# Patient Record
Sex: FEMALE | Race: WHITE | NOT HISPANIC OR LATINO | Employment: FULL TIME | URBAN - METROPOLITAN AREA
[De-identification: names, ages, dates, MRNs, and addresses within clinical notes are randomized per-mention and may not be internally consistent; named-entity substitution may affect disease eponyms.]

---

## 2017-10-18 ENCOUNTER — OFFICE VISIT (OUTPATIENT)
Dept: FAMILY MEDICINE CLINIC | Facility: CLINIC | Age: 42
End: 2017-10-18

## 2017-10-18 VITALS
WEIGHT: 160 LBS | TEMPERATURE: 98 F | HEART RATE: 73 BPM | HEIGHT: 69 IN | SYSTOLIC BLOOD PRESSURE: 95 MMHG | DIASTOLIC BLOOD PRESSURE: 66 MMHG | RESPIRATION RATE: 16 BRPM | BODY MASS INDEX: 23.7 KG/M2

## 2017-10-18 DIAGNOSIS — R10.11 RUQ DISCOMFORT: Primary | ICD-10-CM

## 2017-10-18 PROBLEM — K80.20 CALCULUS OF GALLBLADDER WITHOUT CHOLECYSTITIS WITHOUT OBSTRUCTION: Status: ACTIVE | Noted: 2017-06-21

## 2017-10-18 PROCEDURE — 99202 OFFICE O/P NEW SF 15 MIN: CPT | Performed by: FAMILY MEDICINE

## 2017-10-18 NOTE — PROGRESS NOTES
"Subjective   Opal Yates is a 42 y.o. female.     CC: Establishment of Care for Abdominal Pain    History of Present Illness     Pt presents for the first time today to establish care and discuss some RUQ pain she has had since her GB removed by Dr. Arellano in June this year. Pt reports it's not really \"pain\" but rather a waxing/waning \"sensation\" in the RUQ, sometimes after eating. BMs reported as pretty regular (has a h/o IBS when younger).     Also, pt reports a FH of CAD.  The following portions of the patient's history were reviewed and updated as appropriate: allergies, current medications, past family history, past medical history, past social history, past surgical history and problem list.    Review of Systems   Constitutional: Negative for activity change, chills, fatigue and fever.   Respiratory: Negative for cough and chest tightness.    Cardiovascular: Negative for chest pain and palpitations.   Gastrointestinal: Positive for abdominal pain. Negative for nausea.   Endocrine: Negative for cold intolerance.   Psychiatric/Behavioral: Negative for behavioral problems and dysphoric mood.     BP 95/66  Pulse 73  Temp 98 °F (36.7 °C) (Oral)   Resp 16  Ht 69\" (175.3 cm)  Wt 160 lb (72.6 kg)  BMI 23.63 kg/m2    Objective   Physical Exam   Constitutional: She appears well-developed and well-nourished.   Neck: Neck supple. No thyromegaly present.   Cardiovascular: Normal rate and regular rhythm.    No murmur heard.  Pulmonary/Chest: Effort normal and breath sounds normal.   Abdominal: Bowel sounds are normal.   Psychiatric: She has a normal mood and affect. Her behavior is normal.   Nursing note and vitals reviewed.  Geoffrey present for exam.  Labs from prior MD done in August reviewed and good.     Assessment/Plan   Opal was seen today for gallbladder removed 6/30/17 and family heart disease.    Diagnoses and all orders for this visit:    RUQ discomfort      Pt doing actually quite well and will monitor at " present.

## 2017-11-21 ENCOUNTER — ANESTHESIA (OUTPATIENT)
Dept: GASTROENTEROLOGY | Facility: HOSPITAL | Age: 42
End: 2017-11-21

## 2017-11-21 ENCOUNTER — HOSPITAL ENCOUNTER (OUTPATIENT)
Facility: HOSPITAL | Age: 42
Setting detail: HOSPITAL OUTPATIENT SURGERY
Discharge: HOME | End: 2017-11-21
Attending: SURGERY | Admitting: SURGERY

## 2017-11-21 ENCOUNTER — ANESTHESIA EVENT (OUTPATIENT)
Dept: GASTROENTEROLOGY | Facility: HOSPITAL | Age: 42
End: 2017-11-21

## 2017-11-21 VITALS
HEART RATE: 76 BPM | WEIGHT: 157.1 LBS | OXYGEN SATURATION: 100 % | BODY MASS INDEX: 23.27 KG/M2 | TEMPERATURE: 98.2 F | DIASTOLIC BLOOD PRESSURE: 65 MMHG | HEIGHT: 69 IN | SYSTOLIC BLOOD PRESSURE: 98 MMHG | RESPIRATION RATE: 14 BRPM

## 2017-11-21 LAB
B-HCG UR QL: NEGATIVE
INTERNAL NEGATIVE CONTROL: NEGATIVE
INTERNAL POSITIVE CONTROL: POSITIVE
Lab: NORMAL

## 2017-11-21 PROCEDURE — 25010000002 PROPOFOL 10 MG/ML EMULSION: Performed by: ANESTHESIOLOGY

## 2017-11-21 RX ORDER — PROPOFOL 10 MG/ML
VIAL (ML) INTRAVENOUS AS NEEDED
Status: DISCONTINUED | OUTPATIENT
Start: 2017-11-21 | End: 2017-11-21 | Stop reason: SURG

## 2017-11-21 RX ORDER — LIDOCAINE HYDROCHLORIDE 20 MG/ML
INJECTION, SOLUTION INFILTRATION; PERINEURAL AS NEEDED
Status: DISCONTINUED | OUTPATIENT
Start: 2017-11-21 | End: 2017-11-21 | Stop reason: SURG

## 2017-11-21 RX ORDER — PROPOFOL 10 MG/ML
VIAL (ML) INTRAVENOUS CONTINUOUS PRN
Status: DISCONTINUED | OUTPATIENT
Start: 2017-11-21 | End: 2017-11-21 | Stop reason: SURG

## 2017-11-21 RX ORDER — SODIUM CHLORIDE 0.9 % (FLUSH) 0.9 %
1-10 SYRINGE (ML) INJECTION AS NEEDED
Status: DISCONTINUED | OUTPATIENT
Start: 2017-11-21 | End: 2017-11-21 | Stop reason: HOSPADM

## 2017-11-21 RX ORDER — SODIUM CHLORIDE, SODIUM LACTATE, POTASSIUM CHLORIDE, CALCIUM CHLORIDE 600; 310; 30; 20 MG/100ML; MG/100ML; MG/100ML; MG/100ML
30 INJECTION, SOLUTION INTRAVENOUS CONTINUOUS PRN
Status: DISCONTINUED | OUTPATIENT
Start: 2017-11-21 | End: 2017-11-21 | Stop reason: HOSPADM

## 2017-11-21 RX ADMIN — LIDOCAINE HYDROCHLORIDE 60 MG: 20 INJECTION, SOLUTION INFILTRATION; PERINEURAL at 09:18

## 2017-11-21 RX ADMIN — SODIUM CHLORIDE, POTASSIUM CHLORIDE, SODIUM LACTATE AND CALCIUM CHLORIDE 30 ML/HR: 600; 310; 30; 20 INJECTION, SOLUTION INTRAVENOUS at 09:11

## 2017-11-21 RX ADMIN — PROPOFOL 100 MG: 10 INJECTION, EMULSION INTRAVENOUS at 09:18

## 2017-11-21 RX ADMIN — PROPOFOL 100 MCG/KG/MIN: 10 INJECTION, EMULSION INTRAVENOUS at 09:18

## 2017-11-21 NOTE — H&P
2017    Date: 2017     Patient: Opal Yates    : 1975   [id number]       History:   The patient is a 42 y.o. female of Jonas Odonnell MD  who presents for screening colonoscopy. The patient currently has no complaints. she  denies any history of nausea, abdominal pain, weight loss, change in bowel habits or rectal bleeding.   Family history is positive for for colon cancer or polyps.  Her sister was recently diagnosed with colon cancer at age 47  The patient has not had a colonoscopy in the past .      The following portions of the patient's history were reviewed and updated as appropriate: allergies, current medications, past family history, past medical history, past social history, past surgical history and problem list.    Past Medical History:   Diagnosis Date   • Calculus of gallbladder without cholecystitis without obstruction 2017   • Irritable bowel disease       Past Surgical History:   Procedure Laterality Date   •  SECTION     • CHOLECYSTECTOMY     • MAMMO BILATERAL  2016    dr pfeiffer   • PAP SMEAR  2016    dr pfeiffer   • TUBAL ABDOMINAL LIGATION       Medications:   Prescriptions Prior to Admission   Medication Sig Dispense Refill Last Dose   • Cetirizine HCl 10 MG capsule Take  by mouth.   2017 at Unknown time   • ibuprofen (ADVIL,MOTRIN) 800 MG tablet Take 800 mg by mouth.   Past Week at Unknown time      Allergies: Penicillins   Social History:  Social History     Social History   • Marital status:      Spouse name: N/A   • Number of children: N/A   • Years of education: N/A     Social History Main Topics   • Smoking status: Never Smoker   • Smokeless tobacco: Never Used   • Alcohol use Yes      Comment: rarely   • Drug use: No   • Sexual activity: Yes     Partners: Male     Other Topics Concern   • None     Social History Narrative        Family History   Problem Relation Age of Onset   • Breast cancer Mother    • Cervical cancer  Maternal Grandmother    • Colon cancer Maternal Grandfather    • Colon cancer Sister           Review of Systems:   Negative  ros    Physical Examination:  General - well developed  female in no distress.  HEENT - pupils are equal, conjunctiva are pink, sclera are non-icteric.  Mouth - mucous membranes are moist. Speech is normal with no hoarseness.  Neck - supple, no adenopathy or masses, no JVD.  Chest - clear.  Heart - regular rate and rhythm.  Abomen - soft, non-tender, non-distended, no masses or hernias.   Ext - no edema or cyanosis, capillary refill brisk.    Impression:  42 y.o. female for screening colonoscopy.    Plan:  Screening colonoscopy.  Generalities of the procedure were described.  Risks of bleeding and/or bowel perforation were discussed.      Signature: No name on file.     CC: Jonas Odonnell MD

## 2017-11-21 NOTE — ANESTHESIA POSTPROCEDURE EVALUATION
"Patient: Opal Yates    Procedure Summary     Date Anesthesia Start Anesthesia Stop Room / Location    11/21/17 0915 0941  [facility]       Procedure Diagnosis Surgeon Provider    COLONOSCOPY (N/A ) No diagnosis on file. MD Cynthia Gatica MD          Anesthesia Type: MAC  Last vitals  BP   109/72 (11/21/17 0908)   Temp   36.6 °C (97.9 °F) (11/21/17 0908)   Pulse   90 (11/21/17 0908)   Resp   18 (11/21/17 0908)     SpO2   100 % (11/21/17 0908)     Post Anesthesia Care and Evaluation    Patient location during evaluation: bedside  Patient participation: complete - patient participated  Level of consciousness: awake  Pain management: adequate  Airway patency: patent  Anesthetic complications: No anesthetic complications    Cardiovascular status: acceptable  Respiratory status: acceptable  Hydration status: acceptable    Comments: BP 92/49 (BP Location: Left arm, Patient Position: Lying)  Pulse 81  Temp 36.6 °C (97.9 °F) (Oral)   Resp 14  Ht 69\" (175.3 cm)  Wt 157 lb 1.6 oz (71.3 kg)  [facility] 11/13/2017  SpO2 98%  BMI 23.2 kg/m2        "

## 2017-11-21 NOTE — PLAN OF CARE
Problem: Patient Care Overview (Adult)  Goal: Plan of Care Review  Outcome: Ongoing (interventions implemented as appropriate)    11/21/17 0851   Coping/Psychosocial Response Interventions   Plan Of Care Reviewed With patient   Patient Care Overview   Progress no change       Goal: Adult Individualization and Mutuality  Outcome: Ongoing (interventions implemented as appropriate)    11/21/17 0851   Individualization   Patient Specific Preferences none       Goal: Discharge Needs Assessment  Outcome: Ongoing (interventions implemented as appropriate)    11/21/17 0851   Discharge Needs Assessment   Concerns To Be Addressed no discharge needs identified   Living Environment   Transportation Available family or friend will provide         Problem: GI Endoscopy (Adult)  Goal: Signs and Symptoms of Listed Potential Problems Will be Absent or Manageable (GI Endoscopy)  Outcome: Ongoing (interventions implemented as appropriate)    11/21/17 0851   GI Endoscopy   Problems Present (GI Endoscopy) none

## 2017-11-21 NOTE — DISCHARGE INSTRUCTIONS
For the next 24 hours patient needs to be with a responsible adult.    For 24 hours DO NOT drive, operate machinery, appliances, drink alcohol, make important decisions or sign legal documents.    Start with a light or bland diet and advance to regular diet as tolerated.    Follow recommendations on procedure report provided by your doctor.    Call Dr. Arellano for problems [phone]    Problems may include but not limited to: large amounts of bleeding, trouble breathing, repeated vomiting, severe unrelieved pain, fever or chills.

## 2017-11-21 NOTE — ANESTHESIA PREPROCEDURE EVALUATION
Anesthesia Evaluation     Patient summary reviewed and Nursing notes reviewed          Airway   Mallampati: I  Dental          Pulmonary - negative pulmonary ROS and normal exam   Cardiovascular - negative cardio ROS and normal exam        Neuro/Psych- negative ROS  GI/Hepatic/Renal/Endo - negative ROS     Musculoskeletal (-) negative ROS    Abdominal    Substance History - negative use     OB/GYN negative ob/gyn ROS         Other                                        Anesthesia Plan    ASA 2     MAC     intravenous induction   Anesthetic plan and risks discussed with patient.

## 2017-11-29 ENCOUNTER — OFFICE VISIT (OUTPATIENT)
Dept: OBSTETRICS AND GYNECOLOGY | Facility: CLINIC | Age: 42
End: 2017-11-29

## 2017-11-29 VITALS
DIASTOLIC BLOOD PRESSURE: 70 MMHG | WEIGHT: 157 LBS | HEIGHT: 69 IN | SYSTOLIC BLOOD PRESSURE: 100 MMHG | BODY MASS INDEX: 23.25 KG/M2

## 2017-11-29 DIAGNOSIS — Z80.3 FAMILY HISTORY OF BREAST CANCER IN MOTHER: ICD-10-CM

## 2017-11-29 DIAGNOSIS — Z80.0 FAMILY HISTORY OF COLON CANCER: ICD-10-CM

## 2017-11-29 DIAGNOSIS — Z01.419 ENCOUNTER FOR GYNECOLOGICAL EXAMINATION WITHOUT ABNORMAL FINDING: Primary | ICD-10-CM

## 2017-11-29 PROCEDURE — 99396 PREV VISIT EST AGE 40-64: CPT | Performed by: OBSTETRICS & GYNECOLOGY

## 2017-11-29 NOTE — PROGRESS NOTES
Subjective     Opal Yates is a 42 y.o. female for annual gyn exam    History of Present Illness   42-year-old white female  3 para 3 presents for annual exam.  Her menstrual cycles are regular.  She received a mammogram in the office today.  Her mother and maternal aunt have both been diagnosed with breast cancer.  Her older sister age 47 was just diagnosed with a malignant polyp of the colon.  The patient underwent screening colonoscopy last week which was reassuring.  She has no complaints.      The following portions of the patient's history were reviewed and updated as appropriate: allergies, current medications, past family history, past medical history, past social history, past surgical history and problem list.      Review of Systems   Constitutional: Negative for activity change, appetite change, fatigue and unexpected weight change.   HENT: Negative.    Eyes: Negative.    Respiratory: Negative.    Cardiovascular: Negative.    Gastrointestinal: Negative for abdominal distention, abdominal pain, anal bleeding, constipation, diarrhea, nausea and vomiting.   Endocrine: Negative for cold intolerance, heat intolerance, polydipsia, polyphagia and polyuria.   Genitourinary: Negative for difficulty urinating, dysuria, flank pain, frequency, hematuria and urgency.   Musculoskeletal: Negative.    Skin: Negative.    Allergic/Immunologic: Negative.    Neurological: Negative.    Hematological: Negative.    Psychiatric/Behavioral: Negative.        Objective     Physical Exam   Constitutional: She is oriented to person, place, and time. Vital signs are normal. She appears well-developed and well-nourished. She is cooperative.   HENT:   Head: Normocephalic.   Eyes: Conjunctivae are normal. Pupils are equal, round, and reactive to light.   Neck: Normal range of motion. Neck supple. No tracheal deviation present. No thyromegaly present.   Cardiovascular: Normal rate, regular rhythm and normal heart sounds.  Exam  reveals no gallop and no friction rub.    No murmur heard.  Pulmonary/Chest: Effort normal and breath sounds normal. No respiratory distress. She has no wheezes. Right breast exhibits no inverted nipple, no mass, no nipple discharge, no skin change and no tenderness. Left breast exhibits no inverted nipple, no mass, no nipple discharge, no skin change and no tenderness. Breasts are symmetrical. There is no breast swelling.   Abdominal: Soft. Bowel sounds are normal. She exhibits no distension, no ascites and no mass. There is no hepatosplenomegaly. There is no tenderness. There is no rebound, no guarding and no CVA tenderness. No hernia. Hernia confirmed negative in the right inguinal area and confirmed negative in the left inguinal area.   Genitourinary: Rectal exam shows no fissure, no mass, no tenderness and anal tone normal. Pelvic exam was performed with patient supine. No labial fusion. There is no rash, tenderness, lesion or injury on the right labia. There is no rash, tenderness, lesion or injury on the left labia. Uterus is not deviated, not enlarged, not fixed and not tender. Cervix exhibits no motion tenderness, no discharge and no friability. Right adnexum displays no mass, no tenderness and no fullness. Left adnexum displays no mass, no tenderness and no fullness. No erythema, tenderness or bleeding in the vagina. No foreign body in the vagina. No signs of injury around the vagina. No vaginal discharge found.   Musculoskeletal: Normal range of motion. She exhibits no edema or deformity.   Lymphadenopathy:     She has no cervical adenopathy.        Right: No inguinal adenopathy present.        Left: No inguinal adenopathy present.   Neurological: She is alert and oriented to person, place, and time. She has normal reflexes. No cranial nerve deficit. Coordination normal.   Skin: Skin is warm and dry. No rash noted. No erythema.   Psychiatric: She has a normal mood and affect. Her behavior is normal.          Opal was seen today for gynecologic exam.    Diagnoses and all orders for this visit:    Encounter for gynecological examination without abnormal finding  -     Pap IG, Rfx HPV ASCU - ThinPrep Vial, Cervix    Family history of breast cancer in mother    Family history of colon cancer    The patient is doing well.  I encouraged a healthy diet and regular exercise.  Annual exams were recommended.

## 2017-12-11 LAB
CONV .: NORMAL
CYTOLOGIST CVX/VAG CYTO: NORMAL
CYTOLOGY CVX/VAG DOC THIN PREP: NORMAL
DX ICD CODE: NORMAL
HIV 1 & 2 AB SER-IMP: NORMAL
OTHER STN SPEC: NORMAL
PATH REPORT.FINAL DX SPEC: NORMAL
STAT OF ADQ CVX/VAG CYTO-IMP: NORMAL

## 2018-05-29 ENCOUNTER — OFFICE VISIT (OUTPATIENT)
Dept: FAMILY MEDICINE CLINIC | Facility: CLINIC | Age: 43
End: 2018-05-29

## 2018-05-29 VITALS
HEIGHT: 69 IN | DIASTOLIC BLOOD PRESSURE: 61 MMHG | BODY MASS INDEX: 24.44 KG/M2 | RESPIRATION RATE: 14 BRPM | HEART RATE: 64 BPM | WEIGHT: 165 LBS | SYSTOLIC BLOOD PRESSURE: 90 MMHG | TEMPERATURE: 97.9 F

## 2018-05-29 DIAGNOSIS — Z80.0 FAMILY HISTORY OF COLON CANCER: ICD-10-CM

## 2018-05-29 DIAGNOSIS — Z84.81 FAMILY HISTORY OF GENETIC DISEASE CARRIER: Primary | ICD-10-CM

## 2018-05-29 PROCEDURE — 99212 OFFICE O/P EST SF 10 MIN: CPT | Performed by: FAMILY MEDICINE

## 2018-05-29 NOTE — PROGRESS NOTES
"Subjective   Opal Yates is a 42 y.o. female.     CC: Genetic Testing Consultation    History of Present Illness     Pt comes in today to discuss genetic testing. Sister had colon cancer and then got gene testing showing a mutation, and was recommended further testing. Had a negative c-scope last year.    The following portions of the patient's history were reviewed and updated as appropriate: allergies, current medications, past family history, past medical history, past social history, past surgical history and problem list.    Review of Systems   Constitutional: Negative for activity change, chills, fatigue and fever.   Respiratory: Negative for cough and chest tightness.    Cardiovascular: Negative for chest pain and palpitations.   Gastrointestinal: Negative for abdominal pain and nausea.   Endocrine: Negative for cold intolerance.   Psychiatric/Behavioral: Negative for behavioral problems and dysphoric mood.     BP 90/61   Pulse 64   Temp 97.9 °F (36.6 °C) (Oral)   Resp 14   Ht 175.3 cm (69\")   Wt 74.8 kg (165 lb)   BMI 24.37 kg/m²     Objective   Physical Exam   Constitutional: She appears well-developed and well-nourished.   Neck: Neck supple. No thyromegaly present.   Cardiovascular: Normal rate and regular rhythm.    No murmur heard.  Pulmonary/Chest: Effort normal and breath sounds normal.   Abdominal: Bowel sounds are normal. There is no tenderness.   Neurological: She is alert.   Psychiatric: She has a normal mood and affect. Her behavior is normal.   Nursing note and vitals reviewed.      Assessment/Plan   Opal was seen today for referral.    Diagnoses and all orders for this visit:    Family history of genetic disease carrier  -     Ambulatory Referral to Genetics    Family history of colon cancer  -     Ambulatory Referral to Genetics             "

## 2018-12-19 ENCOUNTER — PROCEDURE VISIT (OUTPATIENT)
Dept: OBSTETRICS AND GYNECOLOGY | Facility: CLINIC | Age: 43
End: 2018-12-19

## 2018-12-19 ENCOUNTER — OFFICE VISIT (OUTPATIENT)
Dept: OBSTETRICS AND GYNECOLOGY | Facility: CLINIC | Age: 43
End: 2018-12-19

## 2018-12-19 ENCOUNTER — APPOINTMENT (OUTPATIENT)
Dept: WOMENS IMAGING | Facility: HOSPITAL | Age: 43
End: 2018-12-19

## 2018-12-19 VITALS
SYSTOLIC BLOOD PRESSURE: 100 MMHG | WEIGHT: 165 LBS | BODY MASS INDEX: 24.44 KG/M2 | HEIGHT: 69 IN | DIASTOLIC BLOOD PRESSURE: 70 MMHG

## 2018-12-19 DIAGNOSIS — Z80.3 FAMILY HISTORY OF BREAST CANCER IN MOTHER: ICD-10-CM

## 2018-12-19 DIAGNOSIS — Z80.0 FAMILY HISTORY OF COLON CANCER: ICD-10-CM

## 2018-12-19 DIAGNOSIS — Z01.411 ENCOUNTER FOR GYNECOLOGICAL EXAMINATION WITH ABNORMAL FINDING: Primary | ICD-10-CM

## 2018-12-19 DIAGNOSIS — N90.89 PERINEAL CYST IN FEMALE: ICD-10-CM

## 2018-12-19 DIAGNOSIS — Z12.31 VISIT FOR SCREENING MAMMOGRAM: Primary | ICD-10-CM

## 2018-12-19 PROCEDURE — 77067 SCR MAMMO BI INCL CAD: CPT | Performed by: OBSTETRICS & GYNECOLOGY

## 2018-12-19 PROCEDURE — 99396 PREV VISIT EST AGE 40-64: CPT | Performed by: OBSTETRICS & GYNECOLOGY

## 2018-12-19 PROCEDURE — 77067 SCR MAMMO BI INCL CAD: CPT | Performed by: RADIOLOGY

## 2018-12-19 NOTE — PROGRESS NOTES
Subjective    Opal Yates is a 43 y.o. female for annual gyn exam    History of Present Illness   43-year-old multiparous white female presents for routine gynecologic exam.  Her menstrual cycles are regular.  She is status post tubal ligation in the past.  There is a family history of colon cancer in her sister.  The patient has already had a screening colonoscopy.  The patient states she has noted a bump just to the left of her perineum which has been there for about a year.  It has remained unchanged and there is no tenderness, discharge, or swelling.  The patient had a screening mammogram this morning.      The following portions of the patient's history were reviewed and updated as appropriate: allergies, current medications, past family history, past medical history, past social history, past surgical history and problem list.      Review of Systems   Constitutional: Negative for activity change, appetite change, fatigue and unexpected weight change.   HENT: Negative.    Eyes: Negative.    Respiratory: Negative.    Cardiovascular: Negative.    Gastrointestinal: Negative for abdominal distention, abdominal pain, anal bleeding, constipation, diarrhea, nausea and vomiting.   Endocrine: Negative for cold intolerance, heat intolerance, polydipsia, polyphagia and polyuria.   Genitourinary: Negative for difficulty urinating, dysuria, flank pain, frequency, hematuria and urgency.   Musculoskeletal: Negative.    Skin: Negative.    Allergic/Immunologic: Negative.    Neurological: Negative.    Hematological: Negative.    Psychiatric/Behavioral: Negative.            Physical Exam   Constitutional: She is oriented to person, place, and time. Vital signs are normal. She appears well-developed and well-nourished. She is cooperative.   HENT:   Head: Normocephalic.   Eyes: Conjunctivae are normal. Pupils are equal, round, and reactive to light.   Neck: Normal range of motion. Neck supple. No tracheal deviation present. No  thyromegaly present.   Cardiovascular: Normal rate, regular rhythm and normal heart sounds. Exam reveals no gallop and no friction rub.   No murmur heard.  Pulmonary/Chest: Effort normal and breath sounds normal. No respiratory distress. She has no wheezes. Right breast exhibits no inverted nipple, no mass, no nipple discharge, no skin change and no tenderness. Left breast exhibits no inverted nipple, no mass, no nipple discharge, no skin change and no tenderness. Breasts are symmetrical. There is no breast swelling.   Abdominal: Soft. Bowel sounds are normal. She exhibits no distension, no ascites and no mass. There is no hepatosplenomegaly. There is no tenderness. There is no rebound, no guarding and no CVA tenderness. No hernia. Hernia confirmed negative in the right inguinal area and confirmed negative in the left inguinal area.   Genitourinary: Rectal exam shows no fissure, no mass, no tenderness and anal tone normal.       Pelvic exam was performed with patient supine. No labial fusion. There is no rash, tenderness, lesion or injury on the right labia. There is no rash, tenderness, lesion or injury on the left labia. Uterus is not deviated, not enlarged, not fixed and not tender. Cervix exhibits no motion tenderness, no discharge and no friability. Right adnexum displays no mass, no tenderness and no fullness. Left adnexum displays no mass, no tenderness and no fullness. No erythema, tenderness or bleeding in the vagina. No foreign body in the vagina. No signs of injury around the vagina. No vaginal discharge found.   Genitourinary Comments: There is a 1 cm mobile nontender nodule just to the left of the perineum.  There is no induration or exudate or erythema.  There is no palpable inguinal adenopathy.   Musculoskeletal: Normal range of motion. She exhibits no edema or deformity.   Lymphadenopathy:     She has no cervical adenopathy.        Right: No inguinal adenopathy present.        Left: No inguinal  adenopathy present.   Neurological: She is alert and oriented to person, place, and time. She has normal reflexes. No cranial nerve deficit. Coordination normal.   Skin: Skin is warm and dry. No rash noted. No erythema.   Psychiatric: She has a normal mood and affect. Her behavior is normal.         Opal was seen today for gynecologic exam.    Diagnoses and all orders for this visit:    Encounter for gynecological examination with abnormal finding  -     Pap IG, Rfx HPV ASCU    Family history of breast cancer in mother    Family history of colon cancer    Perineal cyst in female     we discussed her symptoms and the findings.  We discussed options of treatment including excision of the cyst versus observation.  I encouraged a healthy diet and regular exercise.  Annual exams were recommended.

## 2019-08-14 ENCOUNTER — OFFICE VISIT (OUTPATIENT)
Dept: FAMILY MEDICINE CLINIC | Facility: CLINIC | Age: 44
End: 2019-08-14

## 2019-08-14 VITALS
SYSTOLIC BLOOD PRESSURE: 103 MMHG | TEMPERATURE: 98.4 F | DIASTOLIC BLOOD PRESSURE: 71 MMHG | HEART RATE: 83 BPM | WEIGHT: 163 LBS | BODY MASS INDEX: 24.14 KG/M2 | RESPIRATION RATE: 16 BRPM | HEIGHT: 69 IN

## 2019-08-14 DIAGNOSIS — R00.2 HEART PALPITATIONS: Primary | ICD-10-CM

## 2019-08-14 DIAGNOSIS — Z00.00 ANNUAL PHYSICAL EXAM: ICD-10-CM

## 2019-08-14 PROCEDURE — 93000 ELECTROCARDIOGRAM COMPLETE: CPT | Performed by: FAMILY MEDICINE

## 2019-08-14 PROCEDURE — 99214 OFFICE O/P EST MOD 30 MIN: CPT | Performed by: FAMILY MEDICINE

## 2019-08-14 NOTE — PROGRESS NOTES
"Subjective   Opal Yates is a 43 y.o. female.      CC: Palpitations    History of Present Illness     Pt comes in today c/o some AM palpitations of her heart that last 2-3 minutes. Got some \"chest heaviness\" yesterday, too. No GERD-sx reported. No feelings like she could pass out. Comes in cycles when happens.     FH: dad with hypothyroidism and daughter with Hashimotos.    The following portions of the patient's history were reviewed and updated as appropriate: allergies, current medications, past family history, past medical history, past social history, past surgical history and problem list.    Review of Systems   Constitutional: Negative for activity change, chills, fatigue and fever.   Respiratory: Negative for cough and chest tightness.    Cardiovascular: Positive for palpitations. Negative for chest pain.   Gastrointestinal: Negative for abdominal pain and nausea.   Endocrine: Negative for cold intolerance.   Psychiatric/Behavioral: Negative for behavioral problems and dysphoric mood.       /71   Pulse 83   Temp 98.4 °F (36.9 °C) (Oral)   Resp 16   Ht 175.3 cm (69.02\")   Wt 73.9 kg (163 lb)   BMI 24.06 kg/m²     Objective   Physical Exam   Constitutional: She appears well-developed and well-nourished.   Neck: Neck supple. No thyromegaly present.   Cardiovascular: Normal rate and regular rhythm.   No murmur heard.  Pulmonary/Chest: Effort normal and breath sounds normal.   Abdominal: Bowel sounds are normal. There is no tenderness.   Neurological: She is alert.   Psychiatric: She has a normal mood and affect. Her behavior is normal.   Nursing note and vitals reviewed.      Assessment/Plan   Opal was seen today for heart.    Diagnoses and all orders for this visit:    Heart palpitations  -     Comprehensive metabolic panel  -     CBC and Differential  -     TSH  -     Thyroid Stimulating Immunoglobulin  -     Ambulatory Referral to Cardiology  -     ECG 12 Lead    Annual physical " exam  Comments:  for labs only, don't bill  Orders:  -     Comprehensive metabolic panel  -     Lipid panel  -     CBC and Differential  -     TSH

## 2019-08-14 NOTE — PROGRESS NOTES
Procedure     ECG 12 Lead  Date/Time: 8/14/2019 12:25 PM  Performed by: Jonas Odonnell MD  Authorized by: Jonas Odonnell MD   Comparison: not compared with previous ECG   Previous ECG: no previous ECG available  Rhythm: sinus rhythm  Rate: normal  Conduction: conduction normal  ST Segments: ST segments normal  T Waves: T waves normal  QRS axis: normal  Other: no other findings    Clinical impression: normal ECG

## 2019-08-19 LAB
ALBUMIN SERPL-MCNC: 4.4 G/DL (ref 3.5–5.2)
ALBUMIN/GLOB SERPL: 1.8 G/DL
ALP SERPL-CCNC: 53 U/L (ref 39–117)
ALT SERPL-CCNC: 11 U/L (ref 1–33)
AST SERPL-CCNC: 14 U/L (ref 1–32)
BASOPHILS # BLD AUTO: 0.03 10*3/MM3 (ref 0–0.2)
BASOPHILS NFR BLD AUTO: 0.5 % (ref 0–1.5)
BILIRUB SERPL-MCNC: 0.6 MG/DL (ref 0.2–1.2)
BUN SERPL-MCNC: 12 MG/DL (ref 6–20)
BUN/CREAT SERPL: 15 (ref 7–25)
CALCIUM SERPL-MCNC: 9 MG/DL (ref 8.6–10.5)
CHLORIDE SERPL-SCNC: 102 MMOL/L (ref 98–107)
CHOLEST SERPL-MCNC: 171 MG/DL (ref 0–200)
CO2 SERPL-SCNC: 28.8 MMOL/L (ref 22–29)
CREAT SERPL-MCNC: 0.8 MG/DL (ref 0.57–1)
EOSINOPHIL # BLD AUTO: 0.04 10*3/MM3 (ref 0–0.4)
EOSINOPHIL NFR BLD AUTO: 0.6 % (ref 0.3–6.2)
ERYTHROCYTE [DISTWIDTH] IN BLOOD BY AUTOMATED COUNT: 11.9 % (ref 12.3–15.4)
GLOBULIN SER CALC-MCNC: 2.5 GM/DL
GLUCOSE SERPL-MCNC: 86 MG/DL (ref 65–99)
HCT VFR BLD AUTO: 43.3 % (ref 34–46.6)
HDLC SERPL-MCNC: 42 MG/DL (ref 40–60)
HGB BLD-MCNC: 13.8 G/DL (ref 12–15.9)
IMM GRANULOCYTES # BLD AUTO: 0.02 10*3/MM3 (ref 0–0.05)
IMM GRANULOCYTES NFR BLD AUTO: 0.3 % (ref 0–0.5)
LDLC SERPL CALC-MCNC: 109 MG/DL (ref 0–100)
LYMPHOCYTES # BLD AUTO: 1.85 10*3/MM3 (ref 0.7–3.1)
LYMPHOCYTES NFR BLD AUTO: 29.9 % (ref 19.6–45.3)
MCH RBC QN AUTO: 30.9 PG (ref 26.6–33)
MCHC RBC AUTO-ENTMCNC: 31.9 G/DL (ref 31.5–35.7)
MCV RBC AUTO: 96.9 FL (ref 79–97)
MONOCYTES # BLD AUTO: 0.49 10*3/MM3 (ref 0.1–0.9)
MONOCYTES NFR BLD AUTO: 7.9 % (ref 5–12)
NEUTROPHILS # BLD AUTO: 3.76 10*3/MM3 (ref 1.7–7)
NEUTROPHILS NFR BLD AUTO: 60.8 % (ref 42.7–76)
NRBC BLD AUTO-RTO: 0 /100 WBC (ref 0–0.2)
PLATELET # BLD AUTO: 167 10*3/MM3 (ref 140–450)
POTASSIUM SERPL-SCNC: 4.4 MMOL/L (ref 3.5–5.2)
PROT SERPL-MCNC: 6.9 G/DL (ref 6–8.5)
RBC # BLD AUTO: 4.47 10*6/MM3 (ref 3.77–5.28)
SODIUM SERPL-SCNC: 141 MMOL/L (ref 136–145)
TRIGL SERPL-MCNC: 99 MG/DL (ref 0–150)
TSH SERPL DL<=0.005 MIU/L-ACNC: 2.3 MIU/ML (ref 0.27–4.2)
TSI SER-ACNC: <0.1 IU/L (ref 0–0.55)
VLDLC SERPL CALC-MCNC: 19.8 MG/DL
WBC # BLD AUTO: 6.19 10*3/MM3 (ref 3.4–10.8)

## 2019-08-22 ENCOUNTER — OFFICE VISIT (OUTPATIENT)
Dept: CARDIOLOGY | Facility: CLINIC | Age: 44
End: 2019-08-22

## 2019-08-22 VITALS
BODY MASS INDEX: 24.59 KG/M2 | HEIGHT: 69 IN | SYSTOLIC BLOOD PRESSURE: 94 MMHG | DIASTOLIC BLOOD PRESSURE: 70 MMHG | WEIGHT: 166 LBS | OXYGEN SATURATION: 99 % | HEART RATE: 71 BPM

## 2019-08-22 DIAGNOSIS — R00.2 PALPITATION: Primary | ICD-10-CM

## 2019-08-22 DIAGNOSIS — R07.2 PRECORDIAL PAIN: ICD-10-CM

## 2019-08-22 PROCEDURE — 99243 OFF/OP CNSLTJ NEW/EST LOW 30: CPT | Performed by: INTERNAL MEDICINE

## 2019-08-30 ENCOUNTER — HOSPITAL ENCOUNTER (OUTPATIENT)
Dept: CARDIOLOGY | Facility: HOSPITAL | Age: 44
Discharge: HOME | End: 2019-08-30
Admitting: INTERNAL MEDICINE

## 2019-08-30 VITALS
DIASTOLIC BLOOD PRESSURE: 70 MMHG | SYSTOLIC BLOOD PRESSURE: 94 MMHG | HEIGHT: 69 IN | BODY MASS INDEX: 24.59 KG/M2 | WEIGHT: 166 LBS

## 2019-08-30 DIAGNOSIS — R07.2 PRECORDIAL PAIN: ICD-10-CM

## 2019-08-30 DIAGNOSIS — R00.2 PALPITATION: ICD-10-CM

## 2019-08-30 LAB
AORTIC ROOT ANNULUS: 2.4 CM
ASCENDING AORTA: 3.3 CM
BH CV ECHO MEAS - ACS: 1.7 CM
BH CV ECHO MEAS - AO MAX PG (FULL): 5.1 MMHG
BH CV ECHO MEAS - AO MAX PG: 8.5 MMHG
BH CV ECHO MEAS - AO MEAN PG (FULL): 2.5 MMHG
BH CV ECHO MEAS - AO MEAN PG: 4.2 MMHG
BH CV ECHO MEAS - AO ROOT AREA (BSA CORRECTED): 1.5
BH CV ECHO MEAS - AO ROOT AREA: 6.3 CM^2
BH CV ECHO MEAS - AO ROOT DIAM: 2.8 CM
BH CV ECHO MEAS - AO V2 MAX: 145.3 CM/SEC
BH CV ECHO MEAS - AO V2 MEAN: 91.2 CM/SEC
BH CV ECHO MEAS - AO V2 VTI: 29.9 CM
BH CV ECHO MEAS - ASC AORTA: 3.3 CM
BH CV ECHO MEAS - AVA(I,A): 2 CM^2
BH CV ECHO MEAS - AVA(I,D): 2 CM^2
BH CV ECHO MEAS - AVA(V,A): 1.9 CM^2
BH CV ECHO MEAS - AVA(V,D): 1.9 CM^2
BH CV ECHO MEAS - BSA(HAYCOCK): 1.9 M^2
BH CV ECHO MEAS - BSA: 1.8 M^2
BH CV ECHO MEAS - BZI_BMI: 26.8 KILOGRAMS/M^2
BH CV ECHO MEAS - BZI_METRIC_HEIGHT: 167.6 CM
BH CV ECHO MEAS - BZI_METRIC_WEIGHT: 75.3 KG
BH CV ECHO MEAS - EDV(MOD-SP2): 62 ML
BH CV ECHO MEAS - EDV(MOD-SP4): 67 ML
BH CV ECHO MEAS - EDV(TEICH): 89.5 ML
BH CV ECHO MEAS - EF(CUBED): 76.1 %
BH CV ECHO MEAS - EF(MOD-BP): 61 %
BH CV ECHO MEAS - EF(MOD-SP2): 61.3 %
BH CV ECHO MEAS - EF(MOD-SP4): 58.2 %
BH CV ECHO MEAS - EF(TEICH): 68.2 %
BH CV ECHO MEAS - ESV(MOD-SP2): 24 ML
BH CV ECHO MEAS - ESV(MOD-SP4): 28 ML
BH CV ECHO MEAS - ESV(TEICH): 28.4 ML
BH CV ECHO MEAS - FS: 37.9 %
BH CV ECHO MEAS - IVS/LVPW: 1
BH CV ECHO MEAS - IVSD: 0.82 CM
BH CV ECHO MEAS - LAT PEAK E' VEL: 15 CM/SEC
BH CV ECHO MEAS - LV DIASTOLIC VOL/BSA (35-75): 36.3 ML/M^2
BH CV ECHO MEAS - LV MASS(C)D: 115.3 GRAMS
BH CV ECHO MEAS - LV MASS(C)DI: 62.4 GRAMS/M^2
BH CV ECHO MEAS - LV MAX PG: 3.4 MMHG
BH CV ECHO MEAS - LV MEAN PG: 1.7 MMHG
BH CV ECHO MEAS - LV SYSTOLIC VOL/BSA (12-30): 15.2 ML/M^2
BH CV ECHO MEAS - LV V1 MAX: 92.2 CM/SEC
BH CV ECHO MEAS - LV V1 MEAN: 61.7 CM/SEC
BH CV ECHO MEAS - LV V1 VTI: 20 CM
BH CV ECHO MEAS - LVIDD: 4.4 CM
BH CV ECHO MEAS - LVIDS: 2.8 CM
BH CV ECHO MEAS - LVLD AP2: 8.2 CM
BH CV ECHO MEAS - LVLD AP4: 7.9 CM
BH CV ECHO MEAS - LVLS AP2: 6.2 CM
BH CV ECHO MEAS - LVLS AP4: 6.9 CM
BH CV ECHO MEAS - LVOT AREA (M): 3.1 CM^2
BH CV ECHO MEAS - LVOT AREA: 3 CM^2
BH CV ECHO MEAS - LVOT DIAM: 2 CM
BH CV ECHO MEAS - LVPWD: 0.82 CM
BH CV ECHO MEAS - MED PEAK E' VEL: 11 CM/SEC
BH CV ECHO MEAS - MR MAX PG: 85 MMHG
BH CV ECHO MEAS - MR MAX VEL: 461.1 CM/SEC
BH CV ECHO MEAS - MV A DUR: 0.13 SEC
BH CV ECHO MEAS - MV A MAX VEL: 62.1 CM/SEC
BH CV ECHO MEAS - MV DEC SLOPE: 360.8 CM/SEC^2
BH CV ECHO MEAS - MV DEC TIME: 0.23 SEC
BH CV ECHO MEAS - MV E MAX VEL: 86.8 CM/SEC
BH CV ECHO MEAS - MV E/A: 1.4
BH CV ECHO MEAS - MV MAX PG: 4.4 MMHG
BH CV ECHO MEAS - MV MEAN PG: 2 MMHG
BH CV ECHO MEAS - MV P1/2T MAX VEL: 83.9 CM/SEC
BH CV ECHO MEAS - MV P1/2T: 68.1 MSEC
BH CV ECHO MEAS - MV V2 MAX: 105.3 CM/SEC
BH CV ECHO MEAS - MV V2 MEAN: 67.7 CM/SEC
BH CV ECHO MEAS - MV V2 VTI: 21.5 CM
BH CV ECHO MEAS - MVA P1/2T LCG: 2.6 CM^2
BH CV ECHO MEAS - MVA(P1/2T): 3.2 CM^2
BH CV ECHO MEAS - MVA(VTI): 2.8 CM^2
BH CV ECHO MEAS - PA ACC TIME: 0.17 SEC
BH CV ECHO MEAS - PA MAX PG (FULL): 1 MMHG
BH CV ECHO MEAS - PA MAX PG: 2.5 MMHG
BH CV ECHO MEAS - PA PR(ACCEL): 4.5 MMHG
BH CV ECHO MEAS - PA V2 MAX: 78.7 CM/SEC
BH CV ECHO MEAS - PULM A REVS DUR: 0.13 SEC
BH CV ECHO MEAS - PULM A REVS VEL: 31 CM/SEC
BH CV ECHO MEAS - PULM DIAS VEL: 63.1 CM/SEC
BH CV ECHO MEAS - PULM S/D: 0.78
BH CV ECHO MEAS - PULM SYS VEL: 49.5 CM/SEC
BH CV ECHO MEAS - PVA(V,A): 2.6 CM^2
BH CV ECHO MEAS - PVA(V,D): 2.6 CM^2
BH CV ECHO MEAS - QP/QS: 0.8
BH CV ECHO MEAS - RV MAX PG: 1.5 MMHG
BH CV ECHO MEAS - RV MEAN PG: 0.85 MMHG
BH CV ECHO MEAS - RV V1 MAX: 60.6 CM/SEC
BH CV ECHO MEAS - RV V1 MEAN: 42.2 CM/SEC
BH CV ECHO MEAS - RV V1 VTI: 14.3 CM
BH CV ECHO MEAS - RVOT AREA: 3.4 CM^2
BH CV ECHO MEAS - RVOT DIAM: 2.1 CM
BH CV ECHO MEAS - SI(AO): 101.3 ML/M^2
BH CV ECHO MEAS - SI(CUBED): 36 ML/M^2
BH CV ECHO MEAS - SI(LVOT): 32.5 ML/M^2
BH CV ECHO MEAS - SI(MOD-SP2): 20.6 ML/M^2
BH CV ECHO MEAS - SI(MOD-SP4): 21.1 ML/M^2
BH CV ECHO MEAS - SI(TEICH): 33.1 ML/M^2
BH CV ECHO MEAS - SUP REN AO DIAM: 1.5 CM
BH CV ECHO MEAS - SV(AO): 187.1 ML
BH CV ECHO MEAS - SV(CUBED): 66.5 ML
BH CV ECHO MEAS - SV(LVOT): 60.1 ML
BH CV ECHO MEAS - SV(MOD-SP2): 38 ML
BH CV ECHO MEAS - SV(MOD-SP4): 39 ML
BH CV ECHO MEAS - SV(RVOT): 48 ML
BH CV ECHO MEAS - SV(TEICH): 61.1 ML
BH CV ECHO MEAS - TAPSE (>1.6): 3.4 CM2
BH CV ECHO MEASUREMENTS AVERAGE E/E' RATIO: 6.68
BH CV XLRA - RV BASE: 2.9 CM
BH CV XLRA - TDI S': 14 CM/SEC
LEFT ATRIUM VOLUME INDEX: 9 ML/M2
MAXIMAL PREDICTED HEART RATE: 175 BPM
SINUS: 3 CM
STJ: 2.8 CM
STRESS TARGET HR: 149 BPM

## 2019-08-30 PROCEDURE — 93306 TTE W/DOPPLER COMPLETE: CPT | Performed by: INTERNAL MEDICINE

## 2019-08-30 PROCEDURE — 93306 TTE W/DOPPLER COMPLETE: CPT

## 2019-09-03 NOTE — PROGRESS NOTES
Subjective:     Encounter Date:08/22/2019      Patient ID: Opal Yates is a 43 y.o. female.    Dear Dr. Odonnell thank you for referring this patient for consultation of her palpitations.  Chief Complaint:  Palpitations    This is a recurrent problem. The current episode started more than 1 month ago. The problem occurs intermittently. Associated symptoms include chest fullness.       43-year-old female who presents today for recurrent episodes of palpitations.  Patient says she has a couple in a week.  Usually lasts 2 to 3 minutes but can also occur just for seconds.  She says she gets a heaviness in her chest but this is independent of the palpitations.  She has a palpitations occur more in the morning.  She has syncopal episode but then states back more than 5 years ago.  She was seen today for further evaluation.      Review of Systems   Cardiovascular: Positive for palpitations.   All other systems reviewed and are negative.      Procedures       Objective:     Physical Exam   Constitutional: She is oriented to person, place, and time. She appears well-developed.   HENT:   Head: Normocephalic.   Eyes: Conjunctivae are normal.   Neck: Normal range of motion.   Cardiovascular: Normal rate, regular rhythm and normal heart sounds.   Pulmonary/Chest: Breath sounds normal.   Abdominal: Soft. Bowel sounds are normal.   Musculoskeletal: Normal range of motion. She exhibits no edema.   Neurological: She is alert and oriented to person, place, and time.   Skin: Skin is warm and dry.   Psychiatric: She has a normal mood and affect. Her behavior is normal.   Vitals reviewed.      Lab Review:       Assessment:          Diagnosis Plan   1. Palpitation  Adult Transthoracic Echo Complete W/ Cont if Necessary Per Protocol    Holter Monitor - 24 Hour   2. Precordial pain  Adult Transthoracic Echo Complete W/ Cont if Necessary Per Protocol    Holter Monitor - 24 Hour          Plan:         1.  Palpitations.  I am going to  jaimevm on auth # informing pt of results and dr directives, informed to call back with any questions or concerns   set up for a monitor as well as an echocardiogram to see if we can determine the etiology.  If her Holter and her echo are unremarkable I reassured her that more than likely this is a very benign rhythm and less it persists I would not intervene upon it.  Hopefully the reassurance will make them resolve again if her work-up is negative.

## 2019-09-30 ENCOUNTER — OFFICE VISIT (OUTPATIENT)
Dept: FAMILY MEDICINE CLINIC | Facility: CLINIC | Age: 44
End: 2019-09-30

## 2019-09-30 VITALS
HEIGHT: 69 IN | SYSTOLIC BLOOD PRESSURE: 90 MMHG | DIASTOLIC BLOOD PRESSURE: 60 MMHG | WEIGHT: 164 LBS | OXYGEN SATURATION: 97 % | BODY MASS INDEX: 24.29 KG/M2 | HEART RATE: 74 BPM | RESPIRATION RATE: 16 BRPM | TEMPERATURE: 99 F

## 2019-09-30 DIAGNOSIS — Z00.00 ANNUAL PHYSICAL EXAM: Primary | ICD-10-CM

## 2019-09-30 DIAGNOSIS — Z23 IMMUNIZATION DUE: ICD-10-CM

## 2019-09-30 PROCEDURE — 90674 CCIIV4 VAC NO PRSV 0.5 ML IM: CPT | Performed by: FAMILY MEDICINE

## 2019-09-30 PROCEDURE — 99396 PREV VISIT EST AGE 40-64: CPT | Performed by: FAMILY MEDICINE

## 2019-09-30 PROCEDURE — 90471 IMMUNIZATION ADMIN: CPT | Performed by: FAMILY MEDICINE

## 2019-09-30 NOTE — PROGRESS NOTES
"Subjective   Opal Yates is a 44 y.o. female.     CC: Annual Exam    History of Present Illness     Opal Yates 44 y.o. female who presents for an Annual Wellness Visit.  she has a history of   Patient Active Problem List   Diagnosis   • Calculus of gallbladder without cholecystitis without obstruction   • Family history of breast cancer in mother   • Family history of colon cancer   .  she has been feeling well.  Labs results discussed in detail with the patient.  Plan to update vaccines if needed today.  I  reviewed health maintenance with her as part of my preventative care plan.    Health Habits:  Dental Exam. up to date  Eye Exam. up to date      The following portions of the patient's history were reviewed and updated as appropriate: allergies, current medications, past family history, past medical history, past social history, past surgical history and problem list.    Review of Systems   Constitutional: Negative for appetite change, fever and unexpected weight change.   HENT: Negative for ear pain, facial swelling and sore throat.    Eyes: Negative for pain and visual disturbance.   Respiratory: Negative for chest tightness, shortness of breath and wheezing.    Cardiovascular: Negative for chest pain and palpitations.   Gastrointestinal: Negative for abdominal pain and blood in stool.   Endocrine: Negative.    Genitourinary: Negative for difficulty urinating and hematuria.   Musculoskeletal: Negative for joint swelling.   Neurological: Negative for tremors, seizures and syncope.   Hematological: Negative for adenopathy.   Psychiatric/Behavioral: Negative.        BP 90/60   Pulse 74   Temp 99 °F (37.2 °C) (Oral)   Resp 16   Ht 175.3 cm (69\")   Wt 74.4 kg (164 lb)   SpO2 97%   BMI 24.22 kg/m²     Objective   Physical Exam   Constitutional: She is oriented to person, place, and time. She appears well-developed and well-nourished.   HENT:   Head: Normocephalic and atraumatic.   Right Ear: External ear " normal.   Left Ear: External ear normal.   Mouth/Throat: No oropharyngeal exudate.   Eyes: Conjunctivae and EOM are normal. Pupils are equal, round, and reactive to light. No scleral icterus.   Neck: Normal range of motion. Neck supple. No thyromegaly present.   Cardiovascular: Normal rate and regular rhythm. Exam reveals no gallop.   No murmur heard.  Pulmonary/Chest: Effort normal and breath sounds normal. She has no wheezes. She has no rales.   Abdominal: Soft. Bowel sounds are normal. She exhibits no distension and no mass. There is no tenderness. No hernia.   Musculoskeletal: Normal range of motion. She exhibits no edema, tenderness or deformity.   Lymphadenopathy:     She has no cervical adenopathy.   Neurological: She is alert and oriented to person, place, and time. She has normal reflexes.   Skin: Skin is warm and dry. No rash noted.   Psychiatric: She has a normal mood and affect. Her behavior is normal.   Nursing note and vitals reviewed.  Labs reviewed with pt today during visit. All questions answered.  Maye present for exam.    Assessment/Plan   Opal was seen today for annual exam and immunizations.    Diagnoses and all orders for this visit:    Annual physical exam    Immunization due  -     Flucelvax Quad=>4Years (PFS)    Diet/exercise discussed.

## 2019-12-30 ENCOUNTER — APPOINTMENT (OUTPATIENT)
Dept: WOMENS IMAGING | Facility: HOSPITAL | Age: 44
End: 2019-12-30

## 2019-12-30 ENCOUNTER — PROCEDURE VISIT (OUTPATIENT)
Dept: OBSTETRICS AND GYNECOLOGY | Facility: CLINIC | Age: 44
End: 2019-12-30

## 2019-12-30 ENCOUNTER — OFFICE VISIT (OUTPATIENT)
Dept: OBSTETRICS AND GYNECOLOGY | Facility: CLINIC | Age: 44
End: 2019-12-30

## 2019-12-30 VITALS
WEIGHT: 164 LBS | SYSTOLIC BLOOD PRESSURE: 100 MMHG | DIASTOLIC BLOOD PRESSURE: 76 MMHG | HEIGHT: 69 IN | BODY MASS INDEX: 24.29 KG/M2

## 2019-12-30 DIAGNOSIS — Z80.0 FAMILY HISTORY OF COLON CANCER: ICD-10-CM

## 2019-12-30 DIAGNOSIS — Z01.419 ENCOUNTER FOR GYNECOLOGICAL EXAMINATION WITHOUT ABNORMAL FINDING: Primary | ICD-10-CM

## 2019-12-30 DIAGNOSIS — Z12.31 VISIT FOR SCREENING MAMMOGRAM: Primary | ICD-10-CM

## 2019-12-30 DIAGNOSIS — Z80.3 FAMILY HISTORY OF BREAST CANCER IN MOTHER: ICD-10-CM

## 2019-12-30 PROCEDURE — 77067 SCR MAMMO BI INCL CAD: CPT | Performed by: OBSTETRICS & GYNECOLOGY

## 2019-12-30 PROCEDURE — 77063 BREAST TOMOSYNTHESIS BI: CPT | Performed by: RADIOLOGY

## 2019-12-30 PROCEDURE — 77067 SCR MAMMO BI INCL CAD: CPT | Performed by: RADIOLOGY

## 2019-12-30 PROCEDURE — 99396 PREV VISIT EST AGE 40-64: CPT | Performed by: OBSTETRICS & GYNECOLOGY

## 2019-12-30 PROCEDURE — 77063 BREAST TOMOSYNTHESIS BI: CPT | Performed by: OBSTETRICS & GYNECOLOGY

## 2019-12-30 NOTE — PROGRESS NOTES
Subjective    Opal Yates is a 44 y.o. female for annual gyn exam    History of Present Illness   44-year-old multiparous white female presents for routine gynecologic exam.  Her menstrual cycles are still regular.  She is status post tubal ligation in the past.  There is a family history of breast cancer in her mother.  The patient is current on her screening mammograms.  She has no complaints.      The following portions of the patient's history were reviewed and updated as appropriate: allergies, current medications, past family history, past medical history, past social history, past surgical history and problem list.      Review of Systems   Constitutional: Negative for activity change, appetite change, fatigue and unexpected weight change.   HENT: Negative.    Eyes: Negative.    Respiratory: Negative.    Cardiovascular: Negative.    Gastrointestinal: Negative for abdominal distention, abdominal pain, anal bleeding, constipation, diarrhea, nausea and vomiting.   Endocrine: Negative for cold intolerance, heat intolerance, polydipsia, polyphagia and polyuria.   Genitourinary: Negative for difficulty urinating, dysuria, flank pain, frequency, hematuria, pelvic pain, urgency, vaginal bleeding and vaginal discharge.   Musculoskeletal: Negative.    Skin: Negative.    Allergic/Immunologic: Negative.    Neurological: Negative.    Hematological: Negative.    Psychiatric/Behavioral: Negative.            Physical Exam   Constitutional: She is oriented to person, place, and time. Vital signs are normal. She appears well-developed and well-nourished. She is cooperative.   HENT:   Head: Normocephalic.   Eyes: Pupils are equal, round, and reactive to light. Conjunctivae are normal.   Neck: Normal range of motion. Neck supple. No tracheal deviation present. No thyromegaly present.   Cardiovascular: Normal rate, regular rhythm and normal heart sounds. Exam reveals no gallop and no friction rub.   No murmur  heard.  Pulmonary/Chest: Effort normal and breath sounds normal. No respiratory distress. She has no wheezes. Right breast exhibits no inverted nipple, no mass, no nipple discharge, no skin change and no tenderness. Left breast exhibits no inverted nipple, no mass, no nipple discharge, no skin change and no tenderness. No breast swelling. Breasts are symmetrical.   Abdominal: Soft. Bowel sounds are normal. She exhibits no distension, no ascites and no mass. There is no hepatosplenomegaly. There is no tenderness. There is no rebound, no guarding and no CVA tenderness. No hernia. Hernia confirmed negative in the right inguinal area and confirmed negative in the left inguinal area.   Genitourinary: Rectal exam shows no fissure, no mass, no tenderness and anal tone normal. No breast swelling. Pelvic exam was performed with patient supine. No labial fusion. There is no rash, tenderness, lesion or injury on the right labia. There is no rash, tenderness, lesion or injury on the left labia. Uterus is not deviated, not enlarged, not fixed and not tender. Cervix exhibits no motion tenderness, no discharge and no friability. Right adnexum displays no mass, no tenderness and no fullness. Left adnexum displays no mass, no tenderness and no fullness. No erythema, tenderness or bleeding in the vagina. No foreign body in the vagina. No signs of injury around the vagina. No vaginal discharge found.   Musculoskeletal: Normal range of motion. She exhibits no edema or deformity.   Lymphadenopathy:     She has no cervical adenopathy.        Right: No inguinal adenopathy present.        Left: No inguinal adenopathy present.   Neurological: She is alert and oriented to person, place, and time. She has normal reflexes. No cranial nerve deficit. Coordination normal.   Skin: Skin is warm and dry. No rash noted. No erythema.   Psychiatric: She has a normal mood and affect. Her behavior is normal.         Opal was seen today for gynecologic  exam.    Diagnoses and all orders for this visit:    Encounter for gynecological examination without abnormal finding  -     Pap IG, Rfx HPV ASCU    Family history of breast cancer in mother    Family history of colon cancer    The patient is doing well.  I encouraged a healthy diet and regular exercise.  We discussed signs and symptoms of the menopause.  Annual exams and mammograms were recommended.

## 2019-12-31 LAB
CONV .: NORMAL
CYTOLOGIST CVX/VAG CYTO: NORMAL
CYTOLOGY CVX/VAG DOC CYTO: NORMAL
CYTOLOGY CVX/VAG DOC THIN PREP: NORMAL
DX ICD CODE: NORMAL
HIV 1 & 2 AB SER-IMP: NORMAL
OTHER STN SPEC: NORMAL
STAT OF ADQ CVX/VAG CYTO-IMP: NORMAL

## 2020-01-08 DIAGNOSIS — R92.8 ABNORMAL MAMMOGRAM: Primary | ICD-10-CM

## 2020-01-23 ENCOUNTER — APPOINTMENT (OUTPATIENT)
Dept: WOMENS IMAGING | Facility: HOSPITAL | Age: 45
End: 2020-01-23

## 2020-01-23 PROCEDURE — 77065 DX MAMMO INCL CAD UNI: CPT | Performed by: RADIOLOGY

## 2020-01-23 PROCEDURE — 76641 ULTRASOUND BREAST COMPLETE: CPT | Performed by: RADIOLOGY

## 2020-01-23 PROCEDURE — G0279 TOMOSYNTHESIS, MAMMO: HCPCS | Performed by: RADIOLOGY

## 2020-01-23 PROCEDURE — 77061 BREAST TOMOSYNTHESIS UNI: CPT | Performed by: RADIOLOGY

## 2020-01-28 ENCOUNTER — TELEPHONE (OUTPATIENT)
Dept: OBSTETRICS AND GYNECOLOGY | Facility: CLINIC | Age: 45
End: 2020-01-28

## 2020-01-28 DIAGNOSIS — N60.02 BREAST CYST, LEFT: Primary | ICD-10-CM

## 2020-01-28 DIAGNOSIS — R92.8 ABNORMAL MAMMOGRAM: ICD-10-CM

## 2020-01-28 NOTE — TELEPHONE ENCOUNTER
Please call to discuss with patient.  She is not aware of results.    [facility] requesting a Left Breat US Guided Cyst Aspiration - for complicated cyst.  Report in Epic. SR

## 2020-01-29 NOTE — TELEPHONE ENCOUNTER
We discussed the ultrasound findings of the left breast and the recommendations for cyst aspiration.  The patient will call and schedule.

## 2020-02-03 ENCOUNTER — APPOINTMENT (OUTPATIENT)
Dept: WOMENS IMAGING | Facility: HOSPITAL | Age: 45
End: 2020-02-03

## 2020-02-03 PROCEDURE — 76942 ECHO GUIDE FOR BIOPSY: CPT | Performed by: RADIOLOGY

## 2020-02-03 PROCEDURE — 19000 PUNCTURE ASPIR CYST BREAST: CPT | Performed by: RADIOLOGY

## 2020-02-05 DIAGNOSIS — N60.02 BREAST CYST, LEFT: ICD-10-CM

## 2020-08-04 ENCOUNTER — OFFICE VISIT (OUTPATIENT)
Dept: FAMILY MEDICINE CLINIC | Facility: CLINIC | Age: 45
End: 2020-08-04

## 2020-08-04 VITALS
BODY MASS INDEX: 25.18 KG/M2 | SYSTOLIC BLOOD PRESSURE: 90 MMHG | RESPIRATION RATE: 16 BRPM | HEIGHT: 69 IN | HEART RATE: 78 BPM | WEIGHT: 170 LBS | TEMPERATURE: 97.8 F | DIASTOLIC BLOOD PRESSURE: 62 MMHG

## 2020-08-04 DIAGNOSIS — M54.50 LUMBAR BACK PAIN: Primary | ICD-10-CM

## 2020-08-04 PROCEDURE — 99213 OFFICE O/P EST LOW 20 MIN: CPT | Performed by: FAMILY MEDICINE

## 2020-08-04 RX ORDER — CELECOXIB 200 MG/1
200 CAPSULE ORAL DAILY
Qty: 30 CAPSULE | Refills: 11 | Status: SHIPPED | OUTPATIENT
Start: 2020-08-04 | End: 2021-01-06

## 2020-08-04 NOTE — PROGRESS NOTES
"Subjective   Opal Yates is a 44 y.o. female.     CC: Back Pain    History of Present Illness     Pt comes in today c/o some issues with lower back pain off/on x 1-2 years. Had whiplash when 18 y/o (chronic neck issues, used chiropractic and Pt prior) and now some some LBP issues that started by awakening her at night. Got a new mattress w/o help. Went to [facility] and XRs reported to \"look like a 69 y/o\". Getting some better with treatments although still sometimes awakens her at night and with some daily issues with aches/pains. Exercise helps, too. Uses Advil with some mild help with use.       The following portions of the patient's history were reviewed and updated as appropriate: allergies, current medications, past family history, past medical history, past social history, past surgical history and problem list.    Review of Systems   Musculoskeletal: Positive for back pain.   Neurological: Negative for weakness and numbness.       BP 90/62   Pulse 78   Temp 97.8 °F (36.6 °C) (Oral)   Resp 16   Ht 175.3 cm (69\")   Wt 77.1 kg (170 lb)   BMI 25.10 kg/m²     Objective   Physical Exam   Constitutional: She is oriented to person, place, and time. She appears well-developed and well-nourished. No distress.   Pulmonary/Chest: Effort normal.   Musculoskeletal: She exhibits tenderness (bilateral lower paraspinal muscle regions, worse with rightward/leftward tilt).   Neurological: She is alert and oriented to person, place, and time.   Psychiatric: She has a normal mood and affect. Her behavior is normal. Thought content normal.   Amye present for exam.    Assessment/Plan   Opal was seen today for low back pain and arthritis.    Diagnoses and all orders for this visit:    Lumbar back pain  -     celecoxib (CeleBREX) 200 MG capsule; Take 1 capsule by mouth Daily.    heat/stretching/exercises discussed.           "

## 2020-10-12 ENCOUNTER — TELEPHONE (OUTPATIENT)
Dept: FAMILY MEDICINE CLINIC | Facility: CLINIC | Age: 45
End: 2020-10-12

## 2020-10-12 DIAGNOSIS — Z00.00 GENERAL MEDICAL EXAM: Primary | ICD-10-CM

## 2020-10-13 ENCOUNTER — OFFICE VISIT (OUTPATIENT)
Dept: FAMILY MEDICINE CLINIC | Facility: CLINIC | Age: 45
End: 2020-10-13

## 2020-10-13 VITALS
HEART RATE: 70 BPM | RESPIRATION RATE: 16 BRPM | DIASTOLIC BLOOD PRESSURE: 63 MMHG | TEMPERATURE: 96.9 F | HEIGHT: 69 IN | WEIGHT: 168 LBS | BODY MASS INDEX: 24.88 KG/M2 | SYSTOLIC BLOOD PRESSURE: 95 MMHG

## 2020-10-13 DIAGNOSIS — Z23 IMMUNIZATION DUE: ICD-10-CM

## 2020-10-13 DIAGNOSIS — Z00.00 ANNUAL PHYSICAL EXAM: Primary | ICD-10-CM

## 2020-10-13 LAB
ALBUMIN SERPL-MCNC: 4.4 G/DL (ref 3.8–4.8)
ALBUMIN/GLOB SERPL: 1.8 {RATIO} (ref 1.2–2.2)
ALP SERPL-CCNC: 65 IU/L (ref 39–117)
ALT SERPL-CCNC: 13 IU/L (ref 0–32)
AST SERPL-CCNC: 17 IU/L (ref 0–40)
BASOPHILS # BLD AUTO: 0 X10E3/UL (ref 0–0.2)
BASOPHILS NFR BLD AUTO: 0 %
BILIRUB SERPL-MCNC: 0.4 MG/DL (ref 0–1.2)
BUN SERPL-MCNC: 11 MG/DL (ref 6–24)
BUN/CREAT SERPL: 13 (ref 9–23)
CALCIUM SERPL-MCNC: 8.9 MG/DL (ref 8.7–10.2)
CHLORIDE SERPL-SCNC: 102 MMOL/L (ref 96–106)
CHOLEST SERPL-MCNC: 164 MG/DL (ref 100–199)
CO2 SERPL-SCNC: 24 MMOL/L (ref 20–29)
CREAT SERPL-MCNC: 0.86 MG/DL (ref 0.57–1)
EOSINOPHIL # BLD AUTO: 0.1 X10E3/UL (ref 0–0.4)
EOSINOPHIL NFR BLD AUTO: 1 %
ERYTHROCYTE [DISTWIDTH] IN BLOOD BY AUTOMATED COUNT: 11.8 % (ref 11.7–15.4)
GLOBULIN SER CALC-MCNC: 2.4 G/DL (ref 1.5–4.5)
GLUCOSE SERPL-MCNC: 87 MG/DL (ref 65–99)
HCT VFR BLD AUTO: 41.4 % (ref 34–46.6)
HDLC SERPL-MCNC: 40 MG/DL
HGB BLD-MCNC: 13.5 G/DL (ref 11.1–15.9)
IMM GRANULOCYTES # BLD AUTO: 0 X10E3/UL (ref 0–0.1)
IMM GRANULOCYTES NFR BLD AUTO: 0 %
LDLC SERPL CALC-MCNC: 93 MG/DL (ref 0–99)
LDLC/HDLC SERPL: 2.3 RATIO (ref 0–3.2)
LYMPHOCYTES # BLD AUTO: 2 X10E3/UL (ref 0.7–3.1)
LYMPHOCYTES NFR BLD AUTO: 30 %
MCH RBC QN AUTO: 29.7 PG (ref 26.6–33)
MCHC RBC AUTO-ENTMCNC: 32.6 G/DL (ref 31.5–35.7)
MCV RBC AUTO: 91 FL (ref 79–97)
MONOCYTES # BLD AUTO: 0.6 X10E3/UL (ref 0.1–0.9)
MONOCYTES NFR BLD AUTO: 9 %
NEUTROPHILS # BLD AUTO: 3.8 X10E3/UL (ref 1.4–7)
NEUTROPHILS NFR BLD AUTO: 60 %
PLATELET # BLD AUTO: 180 X10E3/UL (ref 150–450)
POTASSIUM SERPL-SCNC: 4.1 MMOL/L (ref 3.5–5.2)
PROT SERPL-MCNC: 6.8 G/DL (ref 6–8.5)
RBC # BLD AUTO: 4.55 X10E6/UL (ref 3.77–5.28)
SODIUM SERPL-SCNC: 139 MMOL/L (ref 134–144)
TRIGL SERPL-MCNC: 179 MG/DL (ref 0–149)
TSH SERPL DL<=0.005 MIU/L-ACNC: 2.3 UIU/ML (ref 0.45–4.5)
VLDLC SERPL CALC-MCNC: 31 MG/DL (ref 5–40)
WBC # BLD AUTO: 6.5 X10E3/UL (ref 3.4–10.8)

## 2020-10-13 PROCEDURE — 90686 IIV4 VACC NO PRSV 0.5 ML IM: CPT | Performed by: FAMILY MEDICINE

## 2020-10-13 PROCEDURE — 99396 PREV VISIT EST AGE 40-64: CPT | Performed by: FAMILY MEDICINE

## 2020-10-13 PROCEDURE — 90471 IMMUNIZATION ADMIN: CPT | Performed by: FAMILY MEDICINE

## 2020-10-13 NOTE — PROGRESS NOTES
"Subjective   Opal Yates is a 45 y.o. female.     CC: Annual Exam    History of Present Illness     Opal Yates 45 y.o. female who presents for an Annual Wellness Visit.  she has a history of   Patient Active Problem List   Diagnosis   • Calculus of gallbladder without cholecystitis without obstruction   • Family history of breast cancer in mother   • Family history of colon cancer   .  she has been feeling well.  Labs results discussed in detail with the patient.  Plan to update vaccines if needed today.  I  reviewed health maintenance with her as part of my preventative care plan.      The following portions of the patient's history were reviewed and updated as appropriate: allergies, current medications, past family history, past medical history, past social history, past surgical history and problem list.    Review of Systems   Constitutional: Negative for appetite change, fever and unexpected weight change.   HENT: Negative for ear pain, facial swelling and sore throat.    Eyes: Negative for pain and visual disturbance.   Respiratory: Negative for chest tightness, shortness of breath and wheezing.    Cardiovascular: Negative for chest pain and palpitations.   Gastrointestinal: Negative for abdominal pain and blood in stool.   Endocrine: Negative.    Genitourinary: Negative for difficulty urinating and hematuria.   Musculoskeletal: Negative for joint swelling.   Neurological: Negative for tremors, seizures and syncope.   Hematological: Negative for adenopathy.   Psychiatric/Behavioral: Negative.        BP 95/63   Pulse 70   Temp 96.9 °F (36.1 °C) (Oral)   Resp 16   Ht 175.3 cm (69.02\")   Wt 76.2 kg (168 lb)   BMI 24.79 kg/m²     Objective   Physical Exam  Vitals signs and nursing note reviewed.   Constitutional:       Appearance: She is well-developed.   HENT:      Head: Normocephalic and atraumatic.      Right Ear: External ear normal.      Left Ear: External ear normal.      Mouth/Throat:      " Pharynx: No oropharyngeal exudate.   Eyes:      General: No scleral icterus.     Conjunctiva/sclera: Conjunctivae normal.      Pupils: Pupils are equal, round, and reactive to light.   Neck:      Musculoskeletal: Normal range of motion and neck supple.      Thyroid: No thyromegaly.   Cardiovascular:      Rate and Rhythm: Normal rate and regular rhythm.      Heart sounds: No murmur. No gallop.    Pulmonary:      Effort: Pulmonary effort is normal.      Breath sounds: Normal breath sounds. No wheezing or rales.   Abdominal:      General: Bowel sounds are normal. There is no distension.      Palpations: Abdomen is soft. There is no mass.      Tenderness: There is no abdominal tenderness.      Hernia: No hernia is present.   Musculoskeletal: Normal range of motion.         General: No tenderness or deformity.   Lymphadenopathy:      Cervical: No cervical adenopathy.   Skin:     General: Skin is warm and dry.      Findings: No rash.   Neurological:      Mental Status: She is alert and oriented to person, place, and time.      Deep Tendon Reflexes: Reflexes are normal and symmetric.   Psychiatric:         Behavior: Behavior normal.     Labs reviewed with pt today during visit. All questions answered.      Assessment/Plan   Diagnoses and all orders for this visit:    1. Annual physical exam (Primary)    2. Immunization due  -     Fluarix Quad >6 Months (VFC) ([id number])    Diet/exercise discussed.

## 2020-12-30 NOTE — PROGRESS NOTES
"Subjective   Opal Yates is a 45 y.o. female.     CC: Abdominal Pain    History of Present Illness     Pt seen today c/o a roughly 2 week h/o abdominal pain in the RLQ described as \"crampy and achy\". No change in the pain with time. Reports some increased hardness to stools and some increased gas recently. C-Scope is UTD. Going for her GYN visit next week. No prior issues with GYN.      The following portions of the patient's history were reviewed and updated as appropriate: allergies, current medications, past family history, past medical history, past social history, past surgical history and problem list.    Review of Systems   Constitutional: Negative for activity change, chills and fever.   Respiratory: Negative for cough.    Cardiovascular: Negative for chest pain.   Gastrointestinal: Positive for abdominal pain. Negative for blood in stool and nausea.   Psychiatric/Behavioral: Negative for dysphoric mood.       /67   Pulse 83   Temp 96.3 °F (35.7 °C)   Ht 175.3 cm (69.02\")   Wt 76.2 kg (168 lb)   BMI 24.80 kg/m²     Objective   Physical Exam  Constitutional:       General: She is not in acute distress.     Appearance: She is well-developed.   Pulmonary:      Effort: Pulmonary effort is normal.   Abdominal:      General: Abdomen is flat.      Palpations: Abdomen is soft.      Tenderness: There is abdominal tenderness (mild, RLQ).   Neurological:      Mental Status: She is alert and oriented to person, place, and time.   Psychiatric:         Behavior: Behavior normal.         Thought Content: Thought content normal.     Roselia present for exam.    Assessment/Plan   Diagnoses and all orders for this visit:    1. RLQ abdominal pain (Primary)  -     CT Abdomen Pelvis Without Contrast; Future    Will get CT to be sure, but feel this is constipation. Discussed fiber/water and Miralax. Pt to se her GYN next week for that exam.           "

## 2020-12-31 ENCOUNTER — OFFICE VISIT (OUTPATIENT)
Dept: FAMILY MEDICINE CLINIC | Facility: CLINIC | Age: 45
End: 2020-12-31

## 2020-12-31 VITALS
HEIGHT: 69 IN | HEART RATE: 83 BPM | WEIGHT: 168 LBS | BODY MASS INDEX: 24.88 KG/M2 | DIASTOLIC BLOOD PRESSURE: 67 MMHG | TEMPERATURE: 96.3 F | SYSTOLIC BLOOD PRESSURE: 101 MMHG

## 2020-12-31 DIAGNOSIS — R10.31 RLQ ABDOMINAL PAIN: Primary | ICD-10-CM

## 2020-12-31 PROCEDURE — 99214 OFFICE O/P EST MOD 30 MIN: CPT | Performed by: FAMILY MEDICINE

## 2021-01-06 ENCOUNTER — PROCEDURE VISIT (OUTPATIENT)
Dept: OBSTETRICS AND GYNECOLOGY | Facility: CLINIC | Age: 46
End: 2021-01-06

## 2021-01-06 ENCOUNTER — OFFICE VISIT (OUTPATIENT)
Dept: OBSTETRICS AND GYNECOLOGY | Facility: CLINIC | Age: 46
End: 2021-01-06

## 2021-01-06 ENCOUNTER — APPOINTMENT (OUTPATIENT)
Dept: WOMENS IMAGING | Facility: HOSPITAL | Age: 46
End: 2021-01-06

## 2021-01-06 VITALS
HEIGHT: 69 IN | SYSTOLIC BLOOD PRESSURE: 108 MMHG | BODY MASS INDEX: 24.88 KG/M2 | WEIGHT: 168 LBS | DIASTOLIC BLOOD PRESSURE: 65 MMHG

## 2021-01-06 DIAGNOSIS — Z12.31 ENCOUNTER FOR SCREENING MAMMOGRAM FOR MALIGNANT NEOPLASM OF BREAST: ICD-10-CM

## 2021-01-06 DIAGNOSIS — Z12.31 VISIT FOR SCREENING MAMMOGRAM: Primary | ICD-10-CM

## 2021-01-06 DIAGNOSIS — Z01.419 WELL WOMAN EXAM: Primary | ICD-10-CM

## 2021-01-06 PROCEDURE — 77067 SCR MAMMO BI INCL CAD: CPT | Performed by: STUDENT IN AN ORGANIZED HEALTH CARE EDUCATION/TRAINING PROGRAM

## 2021-01-06 PROCEDURE — 77063 BREAST TOMOSYNTHESIS BI: CPT | Performed by: STUDENT IN AN ORGANIZED HEALTH CARE EDUCATION/TRAINING PROGRAM

## 2021-01-06 PROCEDURE — 77067 SCR MAMMO BI INCL CAD: CPT | Performed by: RADIOLOGY

## 2021-01-06 PROCEDURE — 99396 PREV VISIT EST AGE 40-64: CPT | Performed by: STUDENT IN AN ORGANIZED HEALTH CARE EDUCATION/TRAINING PROGRAM

## 2021-01-06 PROCEDURE — 77063 BREAST TOMOSYNTHESIS BI: CPT | Performed by: RADIOLOGY

## 2021-01-06 NOTE — PROGRESS NOTES
GYN Annual Exam     CC- Here for annual exam.     Opal Yates is a 45 y.o. female who presents for annual well woman exam. Periods are regular every 28-30 days, lasting 3-4  days. Dysmenorrhea:moderate, occurring first 1-2 days of flow. Cyclic symptoms include none. No intermenstrual bleeding, spotting, or discharge. She reports that she is due to have a period start this weekend and has already starting cramping a week before. She has also experienced weight gain and difficulty losing weight over the last year.     She has right lower quadrant pain for the last 2-3 weeks that has improved significant since starting bowel regimen with Miralax. She has history of IBS- predominately diarrhea.     OB History        3    Para   3    Term   3            AB        Living           SAB        TAB        Ectopic        Molar        Multiple   1    Live Births                    Current contraception: tubal ligation  History of abnormal Pap smear: no  Family history of uterine, colon or ovarian cancer: yes - colon cancer- MGF; sister diagnosed at age 47. MGM had female cancer.  History of abnormal mammogram: yes - cyst that lead to aspiration and benign  Family history of breast cancer: yes - Mother diagnosed in late 50s ; maternal aunt. Two paternal aunts   Last Pap : 19- NILM   Last mammogram: 2019- [facility] 0--> diagnostic imaging revealed complicated cyst of the left breast in 2020 and she underwent cyst aspiration that returned benign.     Past Medical History:   Diagnosis Date   • Calculus of gallbladder without cholecystitis without obstruction 2017   • Irritable bowel disease        Past Surgical History:   Procedure Laterality Date   •  SECTION     • CHOLECYSTECTOMY     • COLONOSCOPY N/A 2017    Procedure: COLONOSCOPY;  Surgeon: Dimitry Arellano MD;  Location: [facility] ENDOSCOPY;  Service:    • MAMMO BILATERAL  2016    dr pfeiffer   • PAP SMEAR  2016      "kentrell   • TUBAL ABDOMINAL LIGATION  2007         Current Outpatient Medications:   •  Cetirizine HCl 10 MG capsule, Take  by mouth., Disp: , Rfl:     Allergies   Allergen Reactions   • Penicillins Rash       Social History     Tobacco Use   • Smoking status: Never Smoker   • Smokeless tobacco: Never Used   • Tobacco comment: occas  soda   Substance Use Topics   • Alcohol use: Yes     Comment: rarely   • Drug use: No       Family History   Problem Relation Age of Onset   • Breast cancer Mother    • Heart disease Mother    • Cervical cancer Maternal Grandmother    • Colon cancer Maternal Grandfather    • Colon cancer Sister    • Heart disease Father    • Stroke Father    • Hypertension Father    • Heart disease Maternal Uncle    • Heart disease Paternal Uncle    • Stroke Paternal Grandfather    • Breast cancer Maternal Aunt        Review of Systems   All other systems reviewed and are negative.       /65   Ht 175.3 cm (69.02\")   Wt 76.2 kg (168 lb)   LMP 12/13/2020   BMI 24.80 kg/m²   Physical Exam  Vitals signs reviewed. Exam conducted with a chaperone present.   Constitutional:       Appearance: Normal appearance.   HENT:      Head: Normocephalic and atraumatic.      Right Ear: External ear normal.      Left Ear: External ear normal.   Eyes:      Extraocular Movements: Extraocular movements intact.      Pupils: Pupils are equal, round, and reactive to light.   Neck:      Musculoskeletal: Normal range of motion and neck supple.   Cardiovascular:      Rate and Rhythm: Normal rate and regular rhythm.   Pulmonary:      Effort: Pulmonary effort is normal. No respiratory distress.   Chest:      Breasts:         Right: Normal. No swelling, bleeding, inverted nipple, mass, nipple discharge, skin change or tenderness.         Left: No swelling, bleeding, inverted nipple, mass, nipple discharge, skin change or tenderness.      Comments: Scar along left breast from previous cyst aspiration.   Abdominal:      " General: There is no distension.      Palpations: Abdomen is soft. There is no mass.      Tenderness: There is no abdominal tenderness. There is no guarding or rebound.      Hernia: No hernia is present.   Genitourinary:     General: Normal vulva.      Exam position: Lithotomy position.      Labia:         Right: No rash, tenderness, lesion or injury.         Left: No rash, tenderness, lesion or injury.       Urethra: No prolapse or urethral swelling.      Vagina: Normal. No vaginal discharge, erythema, tenderness, bleeding or lesions.      Cervix: Normal.      Uterus: Normal. Not enlarged, not fixed and not tender.       Adnexa: Right adnexa normal and left adnexa normal.        Right: No mass, tenderness or fullness.          Left: No mass, tenderness or fullness.     Musculoskeletal: Normal range of motion.         General: No deformity.   Lymphadenopathy:      Upper Body:      Right upper body: No axillary adenopathy.      Left upper body: No axillary adenopathy.      Lower Body: No right inguinal adenopathy. No left inguinal adenopathy.   Skin:     General: Skin is warm and dry.   Neurological:      General: No focal deficit present.      Mental Status: She is alert and oriented to person, place, and time.   Psychiatric:         Mood and Affect: Mood normal.         Behavior: Behavior normal.         Assessment     1) GYN annual well woman exam.   2)  Breast cancer screening  3) Skin nodule      Plan     1) Breast Health - Clinical breast exam yearly, Self breast awareness monthly. Mammogram performed today.   2) Pap - Up to date   3) Smoking status- Non smoker   4) Activity recommends - Adult 150-300 min/week of multi-component physical activities that include balance training, aerobic and physical strengthening.    5) Skin nodule- Noted on right breast tissue over lying sternum and do not think breast mass but skin nodule. Recommend dermatology referral for evaluation and treatment.   5) Follow up prn and one  year.       Zehra Rice MD

## 2021-01-27 ENCOUNTER — TELEPHONE (OUTPATIENT)
Dept: OBSTETRICS AND GYNECOLOGY | Facility: CLINIC | Age: 46
End: 2021-01-27

## 2021-01-27 NOTE — TELEPHONE ENCOUNTER
Attempted to contact the patient regarding mistaken referral to dermatology but no answer. Left voicemail stating that she does not need to be seen by a dermatologist at this time. Will attempt to call at later time.    eZhra Rice MD  1/27/2021  17:42 EST

## 2021-01-28 ENCOUNTER — TELEPHONE (OUTPATIENT)
Dept: OBSTETRICS AND GYNECOLOGY | Facility: CLINIC | Age: 46
End: 2021-01-28

## 2021-01-29 ENCOUNTER — TELEPHONE (OUTPATIENT)
Dept: FAMILY MEDICINE CLINIC | Facility: CLINIC | Age: 46
End: 2021-01-29

## 2021-01-29 NOTE — TELEPHONE ENCOUNTER
THE PATIENT HAD A CT SCAN DONE YESTERDAY. SHE WOULD LIKE TO KNOW THE RESULTS.    CALLBACK NUMBER: [phone]

## 2021-02-01 ENCOUNTER — TELEPHONE (OUTPATIENT)
Dept: FAMILY MEDICINE CLINIC | Facility: CLINIC | Age: 46
End: 2021-02-01

## 2021-02-01 DIAGNOSIS — N83.209 CYST OF OVARY, UNSPECIFIED LATERALITY: Primary | ICD-10-CM

## 2021-02-01 NOTE — TELEPHONE ENCOUNTER
Caller: Opal Yates    Relationship: Self    Best call back number: [phone] (H)    What orders are you requesting (i.e. lab or imaging): ULTRASOUND    In what timeframe would the patient need to come in: ASA    Where will you receive your lab/imaging services: [facility], [facility] OFFICE FAX# [phone]    Additional notes: PLEASE FAX THE ORDER AS SOON AS POSSIBLE SO PATIENT CAN SCHEDULE ULTRASOUND.

## 2021-02-04 NOTE — TELEPHONE ENCOUNTER
PATIENT STATED THAT [facility] HAS FAXED OVER THE RESULTS OF THE CT SCAN ON 2/3/21.  THERE IS NO INFORMATION ON THIS IN HER CHART.  PLEASE ADVISE

## 2021-02-05 ENCOUNTER — TELEPHONE (OUTPATIENT)
Dept: FAMILY MEDICINE CLINIC | Facility: CLINIC | Age: 46
End: 2021-02-05

## 2021-02-05 NOTE — TELEPHONE ENCOUNTER
Caller: Opal Yates    Relationship: Self    Best call back number: [phone]    What test was performed: [facility]       Additional notes: PATIENT WAS TOLD BY [facility] THAT THEY HAVE SENT RESULTS TO US TWICE TODAY BUT OFFICE HAS TOLD HER WE DONT HAVE THEM. PATIENT WOULD LIKE A CALL BACK ASAP TO GO OVER RESULTS, ATTEMPTED TO WARM TRANSFER.

## 2021-02-05 NOTE — TELEPHONE ENCOUNTER
Heidi was supposed to call her and let her know the U/S was negative, only showing a resolving ovarian cyst.

## 2021-02-10 ENCOUNTER — TELEPHONE (OUTPATIENT)
Dept: FAMILY MEDICINE CLINIC | Facility: CLINIC | Age: 46
End: 2021-02-10

## 2021-02-10 NOTE — TELEPHONE ENCOUNTER
PATIENT AND  ARE PATIENTS OF DR MEHTA. THEY ARE WANTING TO SEE IF HE CAN BE THE SON'S PCP-HE JUST TURNED 16. HIS NAME IS MINE WORRELL 2004    PLEASE ADVISE  [phone]

## 2021-04-06 ENCOUNTER — BULK ORDERING (OUTPATIENT)
Dept: CASE MANAGEMENT | Facility: OTHER | Age: 46
End: 2021-04-06

## 2021-04-06 DIAGNOSIS — Z23 IMMUNIZATION DUE: ICD-10-CM

## 2021-04-12 ENCOUNTER — OFFICE VISIT (OUTPATIENT)
Dept: FAMILY MEDICINE CLINIC | Facility: CLINIC | Age: 46
End: 2021-04-12

## 2021-04-12 VITALS
WEIGHT: 168 LBS | BODY MASS INDEX: 24.88 KG/M2 | DIASTOLIC BLOOD PRESSURE: 59 MMHG | RESPIRATION RATE: 16 BRPM | HEART RATE: 82 BPM | HEIGHT: 69 IN | SYSTOLIC BLOOD PRESSURE: 96 MMHG | OXYGEN SATURATION: 97 % | TEMPERATURE: 96.9 F

## 2021-04-12 DIAGNOSIS — K21.00 GASTROESOPHAGEAL REFLUX DISEASE WITH ESOPHAGITIS WITHOUT HEMORRHAGE: Primary | ICD-10-CM

## 2021-04-12 PROCEDURE — 99213 OFFICE O/P EST LOW 20 MIN: CPT | Performed by: FAMILY MEDICINE

## 2021-04-12 RX ORDER — PANTOPRAZOLE SODIUM 40 MG/1
40 TABLET, DELAYED RELEASE ORAL DAILY
Qty: 30 TABLET | Refills: 2 | Status: SHIPPED | OUTPATIENT
Start: 2021-04-12 | End: 2021-12-07 | Stop reason: ALTCHOICE

## 2021-04-12 NOTE — PROGRESS NOTES
"Subjective   Opal Yates is a 45 y.o. female.     CC: Nausea/Diarrhea    History of Present Illness     Pt comes in today c/o some issues with nausea and diarrhea off/on for the past 3 weeks. The GERD-sx have worsened and she's now taking OTC Prevacid with some mild help. Watching diet closely. The diarrhea was all last week, although none today. Had been taking Celebrex prior to all this due to some ortho issues.     The following portions of the patient's history were reviewed and updated as appropriate: allergies, current medications, past family history, past medical history, past social history, past surgical history and problem list.    Review of Systems   Constitutional: Negative for activity change, chills and fever.   Respiratory: Negative for cough.    Cardiovascular: Negative for chest pain.   Gastrointestinal: Positive for diarrhea.        Heartburn   Psychiatric/Behavioral: Negative for dysphoric mood.       BP 96/59   Pulse 82   Temp 96.9 °F (36.1 °C) (Oral)   Resp 16   Ht 175.3 cm (69.03\")   Wt 76.2 kg (168 lb)   SpO2 97%   BMI 24.79 kg/m²     Objective   Physical Exam  Constitutional:       General: She is not in acute distress.     Appearance: She is well-developed.   Pulmonary:      Effort: Pulmonary effort is normal.   Abdominal:      General: Abdomen is flat. There is no distension (mild, epigastrium).      Palpations: Abdomen is soft. There is no mass.      Tenderness: There is abdominal tenderness.      Hernia: No hernia is present.   Neurological:      Mental Status: She is alert and oriented to person, place, and time.   Psychiatric:         Behavior: Behavior normal.         Thought Content: Thought content normal.     Maye present for exam.    Assessment/Plan   Diagnoses and all orders for this visit:    1. Gastroesophageal reflux disease with esophagitis without hemorrhage (Primary)  -     pantoprazole (PROTONIX) 40 MG EC tablet; Take 1 tablet by mouth Daily.  Dispense: 30 " tablet; Refill: 2    Diet discussed.

## 2021-08-24 ENCOUNTER — OFFICE VISIT (OUTPATIENT)
Dept: FAMILY MEDICINE CLINIC | Facility: CLINIC | Age: 46
End: 2021-08-24

## 2021-08-24 VITALS
HEART RATE: 70 BPM | TEMPERATURE: 97.7 F | WEIGHT: 169 LBS | SYSTOLIC BLOOD PRESSURE: 108 MMHG | BODY MASS INDEX: 25.03 KG/M2 | RESPIRATION RATE: 16 BRPM | HEIGHT: 69 IN | DIASTOLIC BLOOD PRESSURE: 68 MMHG

## 2021-08-24 DIAGNOSIS — R10.31 RIGHT LOWER QUADRANT PAIN: Primary | ICD-10-CM

## 2021-08-24 PROBLEM — K58.0 IRRITABLE BOWEL SYNDROME WITH DIARRHEA: Status: ACTIVE | Noted: 2021-08-24

## 2021-08-24 PROBLEM — K58.1 IRRITABLE BOWEL SYNDROME WITH CONSTIPATION: Status: ACTIVE | Noted: 2021-08-24

## 2021-08-24 LAB
BILIRUB BLD-MCNC: NEGATIVE MG/DL
CLARITY, POC: CLEAR
COLOR UR: YELLOW
GLUCOSE UR STRIP-MCNC: NEGATIVE MG/DL
KETONES UR QL: NEGATIVE
LEUKOCYTE EST, POC: NEGATIVE
NITRITE UR-MCNC: NEGATIVE MG/ML
PH UR: 6 [PH] (ref 5–8)
PROT UR STRIP-MCNC: NEGATIVE MG/DL
RBC # UR STRIP: ABNORMAL /UL
SP GR UR: 1.03 (ref 1–1.03)
UROBILINOGEN UR QL: NORMAL

## 2021-08-24 PROCEDURE — 81003 URINALYSIS AUTO W/O SCOPE: CPT | Performed by: FAMILY MEDICINE

## 2021-08-24 PROCEDURE — 99213 OFFICE O/P EST LOW 20 MIN: CPT | Performed by: FAMILY MEDICINE

## 2021-08-24 NOTE — PROGRESS NOTES
"Subjective   Opal Yates is a 45 y.o. female.     CC: Management of Abdominal Pain    History of Present Illness     Pt returns today after being here 4/21 for the same with this HPI:    Pt comes in today c/o some issues with nausea and diarrhea off/on for the past 3 weeks. The GERD-sx have worsened and she's now taking OTC Prevacid with some mild help. Watching diet closely. The diarrhea was all last week, although none today. Had been taking Celebrex prior to all this due to some ortho issues.   Pt was given Protonix and diet discussed.  Pt had had some symptoms like this prior and had a CT Abd/Pelvis and transvaginal U/S that only showed a resolving Corpus Luteal Cyst. (2/21).  Pt with long h/o diarrhea-predominate IBS,usually well controlled.  Pt reports most of her symptoms occur in the PM, usually after the third meal. Happens several times/week. AM's she doesn't have issues. Reports more gas than prior.     The following portions of the patient's history were reviewed and updated as appropriate: allergies, current medications, past family history, past medical history, past social history, past surgical history and problem list.    Review of Systems   Constitutional: Negative for activity change, chills and fever.   Respiratory: Negative for cough.    Cardiovascular: Negative for chest pain.   Gastrointestinal: Positive for abdominal pain. Negative for blood in stool.   Psychiatric/Behavioral: Negative for dysphoric mood.       /68   Pulse 70   Temp 97.7 °F (36.5 °C) (Oral)   Resp 16   Ht 175.3 cm (69\")   Wt 76.7 kg (169 lb)   BMI 24.96 kg/m²     Objective   Physical Exam  Constitutional:       General: She is not in acute distress.     Appearance: She is well-developed.   Pulmonary:      Effort: Pulmonary effort is normal.   Abdominal:      General: Abdomen is flat.      Palpations: Abdomen is soft.      Tenderness: There is abdominal tenderness (mild , RLQ).   Neurological:      Mental Status: " She is alert and oriented to person, place, and time.   Psychiatric:         Behavior: Behavior normal.         Thought Content: Thought content normal.     Farzaneh Weber, np student, present for exam.  U/A: NEGATIVE    Assessment/Plan   Diagnoses and all orders for this visit:    1. Right lower quadrant pain (Primary)  Comments:  chronic  Orders:  -     POC Urinalysis Dipstick, Automated  -     Ambulatory Referral to Gastroenterology    Discussed Lactose intolerance and Gluten allergy and pt to keep a food diary.

## 2021-09-21 ENCOUNTER — OFFICE VISIT (OUTPATIENT)
Dept: GASTROENTEROLOGY | Facility: CLINIC | Age: 46
End: 2021-09-21

## 2021-09-21 VITALS — WEIGHT: 163.2 LBS | TEMPERATURE: 97.1 F | BODY MASS INDEX: 24.17 KG/M2 | HEIGHT: 69 IN

## 2021-09-21 DIAGNOSIS — K52.9 CHRONIC DIARRHEA: Primary | ICD-10-CM

## 2021-09-21 DIAGNOSIS — K58.0 IRRITABLE BOWEL SYNDROME WITH DIARRHEA: ICD-10-CM

## 2021-09-21 DIAGNOSIS — R10.31 RIGHT LOWER QUADRANT ABDOMINAL PAIN: ICD-10-CM

## 2021-09-21 LAB
ERYTHROCYTE [DISTWIDTH] IN BLOOD BY AUTOMATED COUNT: 11.6 % (ref 12.3–15.4)
HCT VFR BLD AUTO: 42.8 % (ref 34–46.6)
HGB BLD-MCNC: 14.3 G/DL (ref 12–15.9)
IRON SATN MFR SERPL: 19 % (ref 20–50)
IRON SERPL-MCNC: 75 MCG/DL (ref 37–145)
MCH RBC QN AUTO: 30.9 PG (ref 26.6–33)
MCHC RBC AUTO-ENTMCNC: 33.4 G/DL (ref 31.5–35.7)
MCV RBC AUTO: 92.4 FL (ref 79–97)
PLATELET # BLD AUTO: 191 10*3/MM3 (ref 140–450)
RBC # BLD AUTO: 4.63 10*6/MM3 (ref 3.77–5.28)
TIBC SERPL-MCNC: 391 MCG/DL
UIBC SERPL-MCNC: 316 MCG/DL (ref 112–346)
WBC # BLD AUTO: 6.4 10*3/MM3 (ref 3.4–10.8)

## 2021-09-21 PROCEDURE — 99203 OFFICE O/P NEW LOW 30 MIN: CPT | Performed by: INTERNAL MEDICINE

## 2021-09-21 NOTE — PROGRESS NOTES
Chief Complaint   Patient presents with   • RLQ pain   • Diarrhea     Opal Yates is a 46 y.o. female who presents with a history of right lower quadrant pain and diarrhea  HPI     Patient 46-year-old female with history of IBS with diarrhea since January with right lower quadrant pain.  Patient with history of cholecystectomy in 2017 and screening colonoscopy in 2017 as well for family history colon cancer.  Patient reports intermittent right lower quadrant pain for which she was evaluated in 2019 in 2020.  CAT scan and February was negative other than a corpus luteal cyst.  Patient with no reports of constipation on the CT.  Patient reports is tried gluten-free and dairy free but has not changed her pain pattern.  Patient denies any bright red blood per rectum or melena.  Patient post cholecystectomy did seem to have a little bit more tendency for loose stools but not specifically developing diarrhea after.  Patient did have gallstones and a family history of cholecystectomies.  Patient here now with excess exacerbation since January here for further recommendations.    Past Medical History:   Diagnosis Date   • Calculus of gallbladder without cholecystitis without obstruction 6/21/2017   • Irritable bowel disease    • Irritable bowel syndrome with constipation 8/24/2021       Current Outpatient Medications:   •  Cetirizine HCl 10 MG capsule, Take  by mouth., Disp: , Rfl:   •  pantoprazole (PROTONIX) 40 MG EC tablet, Take 1 tablet by mouth Daily., Disp: 30 tablet, Rfl: 2  •  riFAXIMin (XIFAXAN) 550 MG tablet, Take 1 tablet by mouth Every 8 (Eight) Hours., Disp: 42 tablet, Rfl: 2  Allergies   Allergen Reactions   • Penicillins Rash     Social History     Socioeconomic History   • Marital status:      Spouse name: Not on file   • Number of children: Not on file   • Years of education: Not on file   • Highest education level: Not on file   Tobacco Use   • Smoking status: Never Smoker   • Smokeless tobacco:  Never Used   • Tobacco comment: occas  soda   Substance and Sexual Activity   • Alcohol use: Yes     Alcohol/week: 0.0 standard drinks     Comment: Very rarely. Maybe once every few months.   • Drug use: No   • Sexual activity: Yes     Partners: Male     Family History   Problem Relation Age of Onset   • Breast cancer Mother    • Heart disease Mother    • Cervical cancer Maternal Grandmother    • Colon cancer Maternal Grandfather         Had a colostomy   • Colon cancer Sister         She was 47 when diagnosed.   • Colon polyps Sister    • Irritable bowel syndrome Sister    • Heart disease Father    • Stroke Father    • Hypertension Father    • Heart disease Maternal Uncle    • Heart disease Paternal Uncle    • Stroke Paternal Grandfather    • Breast cancer Maternal Aunt      Review of Systems   Constitutional: Negative.    HENT: Negative.    Eyes: Negative.    Respiratory: Negative.    Cardiovascular: Negative.    Gastrointestinal: Positive for abdominal pain and diarrhea. Negative for abdominal distention, anal bleeding, blood in stool, constipation, nausea, rectal pain and vomiting.   Endocrine: Negative.    Musculoskeletal: Negative.    Skin: Negative.    Allergic/Immunologic: Negative.    Hematological: Negative.      Vitals:    09/21/21 0852   Temp: 97.1 °F (36.2 °C)     Physical Exam  Vitals and nursing note reviewed.   Constitutional:       Appearance: She is well-developed and normal weight.   HENT:      Head: Normocephalic and atraumatic.   Eyes:      General: No scleral icterus.     Pupils: Pupils are equal, round, and reactive to light.   Cardiovascular:      Rate and Rhythm: Normal rate and regular rhythm.      Heart sounds: Normal heart sounds. No murmur heard.   No gallop.    Pulmonary:      Effort: Pulmonary effort is normal.      Breath sounds: Normal breath sounds. No wheezing or rales.   Abdominal:      General: Bowel sounds are normal. There is no distension or abdominal bruit.      Palpations:  Abdomen is soft. Abdomen is not rigid. There is no shifting dullness, fluid wave, mass or pulsatile mass.      Tenderness: There is no abdominal tenderness. There is no guarding.      Hernia: No hernia is present.   Musculoskeletal:         General: No swelling or tenderness. Normal range of motion.   Skin:     General: Skin is warm and dry.      Coloration: Skin is not jaundiced.      Findings: No rash.   Neurological:      General: No focal deficit present.      Mental Status: She is alert and oriented to person, place, and time.      Cranial Nerves: No cranial nerve deficit.   Psychiatric:         Behavior: Behavior normal.         Thought Content: Thought content normal.       Diagnoses and all orders for this visit:    Chronic diarrhea  -     CBC (No Diff)  -     Celiac Comprehensive Panel  -     Iron Profile    Right lower quadrant abdominal pain  -     CBC (No Diff)  -     Celiac Comprehensive Panel  -     Iron Profile    Irritable bowel syndrome with diarrhea    Other orders  -     riFAXIMin (XIFAXAN) 550 MG tablet; Take 1 tablet by mouth Every 8 (Eight) Hours.    Patient 46-year-old female diagnosed in her teens with IBS-D presenting with recurrent right lower quadrant pain and chronic diarrhea.  Patient reports pain usually in the afternoon of the evening at its worst.  Patient reports diarrhea seems to resolve the pain at least temporarily.  Typically patient wakes up without pain and pain gets progressive through the evening.  Patient denies any significant weight loss until she tried avoiding all milk and gluten which did not change the pain but did cause her to lose a little weight.  Patient denies any nausea vomiting no melena or bright red blood per rectum.  We will check celiac panel to confirm no celiac sprue but agree most likely IBS in setting of normal colonoscopy in 2017.  Will begin trial Xifaxan 550 3 times daily for 14 days with 2 refills and monitor clinical response.  Recommend colonoscopy  every 5 years which would be November 2022.

## 2021-09-22 LAB
ENDOMYSIUM IGA SER QL: NEGATIVE
GLIADIN PEPTIDE IGA SER-ACNC: 12 UNITS (ref 0–19)
GLIADIN PEPTIDE IGG SER-ACNC: 2 UNITS (ref 0–19)
IGA SERPL-MCNC: 222 MG/DL (ref 87–352)
TTG IGA SER-ACNC: <2 U/ML (ref 0–3)
TTG IGG SER-ACNC: 3 U/ML (ref 0–5)

## 2021-09-23 ENCOUNTER — TELEPHONE (OUTPATIENT)
Dept: GASTROENTEROLOGY | Facility: CLINIC | Age: 46
End: 2021-09-23

## 2021-10-05 ENCOUNTER — TELEPHONE (OUTPATIENT)
Dept: GASTROENTEROLOGY | Facility: CLINIC | Age: 46
End: 2021-10-05

## 2021-10-05 NOTE — TELEPHONE ENCOUNTER
----- Message from Latrell Angulo MD sent at 10/5/2021  1:13 PM EDT -----  Celiac panel negative, let us know how she is doing symptomatically after therapy.

## 2021-10-05 NOTE — TELEPHONE ENCOUNTER
Called pt and advised of Dr. Angulo's note.  Pt verb understanding.  Pt states she has not started the Xifaxan yet, but will call/[employer] message with updates.

## 2021-11-02 NOTE — PROGRESS NOTES
"Subjective   Opal Yates is a 46 y.o. female.     CC: Breathing Issues    History of Present Illness     Pt comes in today after filling out this previsit summary:    I have been experiencing a heaviness in my chest. I can breathe okay but it just feels harder to take a deep breath. Sometimes I have a burning sensation along with it. Pt reports no coughing or wheezing, but rather some \"tightness\" in the chest with breathing for maybe 10 months. Pt reports this started before getting her COVID shots.   No change with sx with exercise. PMH: no asthma prior  FH: both parents with asthma, daughter with asthma.      Off topic,. Pt's chronic diarrhea has completely resolved since taking the Xifaxan.     The following portions of the patient's history were reviewed and updated as appropriate: allergies, current medications, past family history, past medical history, past social history, past surgical history and problem list.    Review of Systems   Constitutional: Negative for activity change, chills and fever.   Respiratory: Negative for cough.    Cardiovascular: Negative for chest pain.   Psychiatric/Behavioral: Negative for dysphoric mood.       Objective   Physical Exam  Constitutional:       General: She is not in acute distress.     Appearance: She is well-developed.   Cardiovascular:      Rate and Rhythm: Normal rate and regular rhythm.   Pulmonary:      Effort: Pulmonary effort is normal.      Breath sounds: Normal breath sounds.   Neurological:      Mental Status: She is alert and oriented to person, place, and time.   Psychiatric:         Behavior: Behavior normal.         Thought Content: Thought content normal.     XR Chest: ordered due to dyspnea, no comparison, read by me: WNL    Assessment/Plan   Diagnoses and all orders for this visit:    1. Dyspnea, unspecified type (Primary)  -     XR Chest 2 View  -     albuterol sulfate  (90 Base) MCG/ACT inhaler; Inhale 2 puffs Every 4 (Four) Hours As Needed " for Wheezing.  Dispense: 8 g; Refill: 2  -     Ambulatory Referral to Allergy

## 2021-11-03 ENCOUNTER — OFFICE VISIT (OUTPATIENT)
Dept: FAMILY MEDICINE CLINIC | Facility: CLINIC | Age: 46
End: 2021-11-03

## 2021-11-03 VITALS
OXYGEN SATURATION: 98 % | BODY MASS INDEX: 24.73 KG/M2 | WEIGHT: 167 LBS | DIASTOLIC BLOOD PRESSURE: 62 MMHG | RESPIRATION RATE: 16 BRPM | TEMPERATURE: 97 F | SYSTOLIC BLOOD PRESSURE: 94 MMHG | HEART RATE: 70 BPM | HEIGHT: 69 IN

## 2021-11-03 DIAGNOSIS — R06.00 DYSPNEA, UNSPECIFIED TYPE: Primary | ICD-10-CM

## 2021-11-03 PROCEDURE — 99213 OFFICE O/P EST LOW 20 MIN: CPT | Performed by: FAMILY MEDICINE

## 2021-11-03 PROCEDURE — 71046 X-RAY EXAM CHEST 2 VIEWS: CPT | Performed by: FAMILY MEDICINE

## 2021-11-03 RX ORDER — ALBUTEROL SULFATE 90 UG/1
2 AEROSOL, METERED RESPIRATORY (INHALATION) EVERY 4 HOURS PRN
Qty: 8 G | Refills: 2 | Status: SHIPPED | OUTPATIENT
Start: 2021-11-03 | End: 2022-01-11

## 2021-11-09 ENCOUNTER — PATIENT MESSAGE (OUTPATIENT)
Dept: GASTROENTEROLOGY | Facility: CLINIC | Age: 46
End: 2021-11-09

## 2021-11-10 ENCOUNTER — TELEPHONE (OUTPATIENT)
Dept: GASTROENTEROLOGY | Facility: CLINIC | Age: 46
End: 2021-11-10

## 2021-11-10 NOTE — TELEPHONE ENCOUNTER
----- Message from Opal Yates sent at 11/9/2021  5:05 PM EST -----  Regarding: Visit Follow-up  I wanted to let you know that I completed the 14 days of the antibiotic and I have not had diarrhea since I started the medication so that is great. My bowel movements are now regular and solid.    However, I am still experiencing intermittent discomfort in my lower right abdomen so I am not sure what is still causing that.

## 2021-11-11 RX ORDER — DICYCLOMINE HYDROCHLORIDE 10 MG/1
10 CAPSULE ORAL
Qty: 120 CAPSULE | Refills: 3 | Status: SHIPPED | OUTPATIENT
Start: 2021-11-11 | End: 2021-12-10 | Stop reason: ALTCHOICE

## 2021-11-11 NOTE — TELEPHONE ENCOUNTER
Recommend trial of antispasmodic Bentyl to see if we can alleviate right lower quadrant discomfort.  Medication has been sent to her pharmacy.  If symptoms do not improve/resolve arrange a follow-up.

## 2021-11-19 ENCOUNTER — TELEPHONE (OUTPATIENT)
Dept: GASTROENTEROLOGY | Facility: CLINIC | Age: 46
End: 2021-11-19

## 2021-11-19 NOTE — TELEPHONE ENCOUNTER
----- Message from Opal Yates sent at 11/19/2021  8:22 AM EST -----  Regarding: Visit Follow-up  I haven't had a chance to try the antispasmodic Bentyl yet but I wanted to know your thoughts on the possibility of colon cancer? 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 I am 46 now. For most of this year I have experienced frequent diarrhea, gas, bloating, lower right abdominal discomfort, and a chest tightness. I am going to get tested for asthma today but I am not convinced that I have asthma. The diarrhea has gotten better since I took the antibiotic treatment but I still have concerns.

## 2021-11-22 NOTE — TELEPHONE ENCOUNTER
Please contact the patient and let her know I have reviewed her concerns with Dr. Angulo.  He says it is okay if she is concerned to do colonoscopy early this time but reassure her that it takes polyps 8 to 12 years to turn cancerous.  If she wants to move forward with a screening colonoscopy please place case request and get her scheduled with Dr. Angulo.

## 2021-12-03 ENCOUNTER — TELEPHONE (OUTPATIENT)
Dept: GASTROENTEROLOGY | Facility: CLINIC | Age: 46
End: 2021-12-03

## 2021-12-03 NOTE — TELEPHONE ENCOUNTER
----- Message from Latrell Angulo MD sent at 12/3/2021  9:41 AM EST -----  Regarding: FW: Visit Follow-up      ----- Message -----  From: Kristi Figuerao RN  Sent: 12/1/2021  11:18 AM EST  To: Latrell Angulo MD  Subject: FW: Visit Follow-up                                ----- Message -----  From: Opal Yates  Sent: 12/1/2021   9:55 AM EST  To: [facility]  Subject: Visit Follow-up                                  Thank you. I will let you know if I decide to move forward with the early colonoscopy.     I do have another concern though. Back in October of 2020 Dr. Odonnell prescribed Celebrex for some back and neck pain that I have. I didn't really take much of it until Jan/Feb of 2021 when I was having a lot of pain. Shortly after that I started getting heartburn almost every day so Dr. Odonnell put me on Pantoprazole. I took that for a couple of months until the symptoms got a bit better but I have been feeling like I have a weight on my chest for most of this year. Sometimes I have a burning sensation but other times it just feels like someone is sitting on my chest. I don't have trouble breathing and a chest X-ray came back clear. Dr. Odonnell sent me to get checked for asthma but my breathing seems to be okay. The allergy and asthma doctor told me to go back on the Pantoprazole and start taking Trelegy. I am also going to follow up with a heart doctor on December 9 to make sure it is not heart related. My question is, does this sound more like an acid reflux issue to you? I am   wondering if I need Dr. Angulo to check that out as well.

## 2021-12-07 ENCOUNTER — OFFICE VISIT (OUTPATIENT)
Dept: GASTROENTEROLOGY | Facility: CLINIC | Age: 46
End: 2021-12-07

## 2021-12-07 VITALS — TEMPERATURE: 96.9 F | BODY MASS INDEX: 24.94 KG/M2 | HEIGHT: 69 IN | WEIGHT: 168.4 LBS

## 2021-12-07 DIAGNOSIS — R07.89 ATYPICAL CHEST PAIN: ICD-10-CM

## 2021-12-07 DIAGNOSIS — K21.9 GASTROESOPHAGEAL REFLUX DISEASE, UNSPECIFIED WHETHER ESOPHAGITIS PRESENT: Primary | ICD-10-CM

## 2021-12-07 PROCEDURE — 99213 OFFICE O/P EST LOW 20 MIN: CPT | Performed by: INTERNAL MEDICINE

## 2021-12-07 RX ORDER — LANSOPRAZOLE 30 MG/1
30 CAPSULE, DELAYED RELEASE ORAL DAILY
Qty: 90 CAPSULE | Refills: 3 | Status: SHIPPED | OUTPATIENT
Start: 2021-12-07

## 2021-12-07 RX ORDER — PHENYLEPHRINE HCL/ACETAMINOPHN 5 MG-325MG
TABLET ORAL AS NEEDED
COMMUNITY
End: 2022-03-15

## 2021-12-07 RX ORDER — BISMUTH SUBSALICYLATE 262 MG/1
524 TABLET, CHEWABLE ORAL AS NEEDED
COMMUNITY
End: 2022-03-15

## 2021-12-07 NOTE — PROGRESS NOTES
Chief Complaint   Patient presents with   • Heartburn       Opal Yates is a  46 y.o. female here for a follow up visit for atypical chest pain and breakthrough heartburn.    HPI     Patient 46-year-old female with history of IBS who presented with persistent diarrhea.  Patient treated last visit with Xifaxan with resolution of her diarrhea.  Patient now complaining of persistent chest pressure with breakthrough heartburn.  Patient previously started on Protonix which seemed to help but now having breakthrough despite the Protonix.  Improving the heart but unfortunately has not improved the chest pressure patient is experiencing.  Patient denies any nausea vomiting no melena or bright red blood per rectum.  Patient scheduled on Thursday for cardiac evaluation.  Patient has been seen by pulmonary who reportedly found no abnormal breathing to explain her chest pressure.    Past Medical History:   Diagnosis Date   • Calculus of gallbladder without cholecystitis without obstruction 6/21/2017   • Hernia 2021    CT scan noted a hiatal hernia.   • Irritable bowel disease    • Irritable bowel syndrome with constipation 8/24/2021         Current Outpatient Medications:   •  albuterol sulfate  (90 Base) MCG/ACT inhaler, Inhale 2 puffs Every 4 (Four) Hours As Needed for Wheezing., Disp: 8 g, Rfl: 2  •  bismuth subsalicylate (PEPTO BISMOL) 262 MG chewable tablet, Chew 524 mg As Needed for Indigestion., Disp: , Rfl:   •  bismuth subsalicylate (PEPTO BISMOL) 262 MG/15ML suspension, Take 15 mL by mouth As Needed for Indigestion., Disp: , Rfl:   •  Cetirizine HCl 10 MG capsule, Take  by mouth., Disp: , Rfl:   •  Fluticasone-Umeclidin-Vilant (Trelegy Ellipta) 100-62.5-25 MCG/INH inhaler, Inhale 1 puff As Needed., Disp: , Rfl:   •  Phenylephrine-Acetaminophen (Tylenol Sinus+Headache) 5-325 MG tablet, Take  by mouth As Needed., Disp: , Rfl:   •  dicyclomine (Bentyl) 10 MG capsule, Take 1 capsule by mouth 4 (Four) Times a Day  Before Meals & at Bedtime., Disp: 120 capsule, Rfl: 3  •  lansoprazole (PREVACID) 30 MG capsule, Take 1 capsule by mouth Daily., Disp: 90 capsule, Rfl: 3    Allergies   Allergen Reactions   • Penicillins Rash       Social History     Socioeconomic History   • Marital status:    Tobacco Use   • Smoking status: Never Smoker   • Smokeless tobacco: Never Used   • Tobacco comment: occas  soda   Substance and Sexual Activity   • Alcohol use: Yes     Alcohol/week: 0.0 standard drinks     Comment: Very rarely. Maybe once every few months.   • Drug use: No   • Sexual activity: Yes     Partners: Male       Family History   Problem Relation Age of Onset   • Breast cancer Mother    • Heart disease Mother    • Cervical cancer Maternal Grandmother    • Colon cancer Maternal Grandfather         Had a colostomy   • Colon cancer Sister         She was 47 when diagnosed.   • Colon polyps Sister    • Irritable bowel syndrome Sister    • Heart disease Father    • Stroke Father    • Hypertension Father    • Heart disease Maternal Uncle    • Heart disease Paternal Uncle    • Stroke Paternal Grandfather    • Breast cancer Maternal Aunt        Review of Systems   Constitutional: Negative.    Respiratory: Positive for chest tightness. Negative for apnea, cough, choking, shortness of breath, wheezing and stridor.    Cardiovascular: Negative.    Gastrointestinal: Negative.    Musculoskeletal: Negative.    Skin: Negative.    Hematological: Negative.        Vitals:    12/07/21 1532   Temp: 96.9 °F (36.1 °C)       Physical Exam  Vitals reviewed.   Constitutional:       Appearance: Normal appearance. She is well-developed and normal weight.   HENT:      Head: Normocephalic and atraumatic.   Eyes:      General: No scleral icterus.     Pupils: Pupils are equal, round, and reactive to light.   Cardiovascular:      Rate and Rhythm: Normal rate and regular rhythm.      Heart sounds: Normal heart sounds.   Pulmonary:      Effort: Pulmonary  effort is normal.      Breath sounds: Normal breath sounds. No wheezing or rales.   Abdominal:      General: Bowel sounds are normal. There is no distension.      Palpations: Abdomen is soft. There is no mass.      Tenderness: There is no abdominal tenderness.      Hernia: No hernia is present.   Skin:     General: Skin is warm and dry.      Coloration: Skin is not jaundiced.      Findings: No rash.   Neurological:      General: No focal deficit present.      Mental Status: She is alert and oriented to person, place, and time.      Cranial Nerves: No cranial nerve deficit.   Psychiatric:         Behavior: Behavior normal.         Thought Content: Thought content normal.         Judgment: Judgment normal.         No visits with results within 2 Month(s) from this visit.   Latest known visit with results is:   Office Visit on 09/21/2021   Component Date Value Ref Range Status   • WBC 09/21/2021 6.40  3.40 - 10.80 10*3/mm3 Final   • RBC 09/21/2021 4.63  3.77 - 5.28 10*6/mm3 Final   • Hemoglobin 09/21/2021 14.3  12.0 - 15.9 g/dL Final   • Hematocrit 09/21/2021 42.8  34.0 - 46.6 % Final   • MCV 09/21/2021 92.4  79.0 - 97.0 fL Final   • MCH 09/21/2021 30.9  26.6 - 33.0 pg Final   • MCHC 09/21/2021 33.4  31.5 - 35.7 g/dL Final   • RDW 09/21/2021 11.6* 12.3 - 15.4 % Final   • Platelets 09/21/2021 191  140 - 450 10*3/mm3 Final   • Gliadin Deamidated Peptide Ab, IgA 09/21/2021 12  0 - 19 units Final   • Deaminated Gliadin Ab IgG 09/21/2021 2  0 - 19 units Final   • Tissue Transglutaminase IgA 09/21/2021 <2  0 - 3 U/mL Final   • Tissue Transglutaminase IgG 09/21/2021 3  0 - 5 U/mL Final   • Endomysial IgA 09/21/2021 Negative  Negative Final   • IgA 09/21/2021 222  87 - 352 mg/dL Final   • TIBC 09/21/2021 391  mcg/dL Final   • UIBC 09/21/2021 316  112 - 346 mcg/dL Final   • Iron 09/21/2021 75  37 - 145 mcg/dL Final   • Iron Saturation 09/21/2021 19* 20 - 50 % Final       Diagnoses and all orders for this visit:    1.  Gastroesophageal reflux disease, unspecified whether esophagitis present (Primary)    2. Atypical chest pain    Other orders  -     lansoprazole (PREVACID) 30 MG capsule; Take 1 capsule by mouth Daily.  Dispense: 90 capsule; Refill: 3    Patient 46-year-old female with history of gallstones and IBS status post Xifaxan therapy for chronic diarrhea which is resolved her diarrhea and pain reports ongoing symptoms of chest pressure and reflux despite taking daily Protonix.  Initially felt better with the Protonix at least from the heartburn point of view but now having frequent breakthrough but the Protonix does not seem to be affecting the chest pressure patient experiencing.  Patient reports seems to have more discomfort at night but not necessarily exertional.  Patient reports the chest pressure is unaffected by eating or drinking.  Patient reports no change with bowel movements or lack of bowel movements.  Patient scheduled for cardiac evaluation, Thursday.  For now will change PPI to see if we can control the breakthrough heartburn and await cardiac clearance.  If cardiology feels this is not cardiac related we will proceed with upper GI evaluation.  We will follow-up clinically based on response to therapy and findings by cardiology.

## 2021-12-10 ENCOUNTER — OFFICE VISIT (OUTPATIENT)
Dept: CARDIOLOGY | Facility: CLINIC | Age: 46
End: 2021-12-10

## 2021-12-10 VITALS
HEART RATE: 81 BPM | BODY MASS INDEX: 24.29 KG/M2 | WEIGHT: 164 LBS | HEIGHT: 69 IN | SYSTOLIC BLOOD PRESSURE: 118 MMHG | DIASTOLIC BLOOD PRESSURE: 74 MMHG

## 2021-12-10 DIAGNOSIS — R07.2 PRECORDIAL CHEST PAIN: Primary | ICD-10-CM

## 2021-12-10 DIAGNOSIS — Z82.49 FH: CAD (CORONARY ARTERY DISEASE): ICD-10-CM

## 2021-12-10 PROCEDURE — 99214 OFFICE O/P EST MOD 30 MIN: CPT | Performed by: NURSE PRACTITIONER

## 2021-12-10 PROCEDURE — 93000 ELECTROCARDIOGRAM COMPLETE: CPT | Performed by: NURSE PRACTITIONER

## 2021-12-10 NOTE — PROGRESS NOTES
[facility]   [address], [address]  [place], [place], [address]    [phone]  [email or web address]           OUTPATIENT CLINIC FOLLOW UP NOTE    Patient Care Team:  Patient Care Team:  Jonas Odonnell MD as PCP - General (Family Medicine)  Gasper Lovell MD as Consulting Physician (Obstetrics and Gynecology)    Subjective:      Chief Complaint   Patient presents with   • Chest tightness     Dr. Mario Mattson ref for chest tightness       HPI:    Opal Yates is a 46 y.o. female.  Problem list:  1. Chest pain  a. TTE 8/2019: EF 61%, trace to mild MR  2. Palpitations  a. Holter monitor 8/2019: Rare PACs/PVCs  3. Irritable bowel syndrome.    She presents today for follow-up.  Her primary cardiologist is Dr. Benjamin whom she last saw in 2019.    The patient reports that over the last 10 months she has had ongoing chest heaviness and tightness.  She reports she feels like she cannot take a deep breath and occasionally has a burning sensation.  She reports this occurs almost constantly.  Her chest heaviness is not worse with exertion and is possibly improved during exercise.  She exercises regularly 5 days/week for 30 minutes.  She denies palpitations, dyspnea, lower extremity edema, lightheadedness, or fatigue.  She was last seen in the cardiology clinic in 2019 for palpitations and chest heaviness.  She underwent echocardiogram and Holter monitor at that time.  She had rare PACs and PVCs on her Holter.  Echocardiogram revealed a normal EF and trace to mild MR.  She notes a familial history of CAD with her father having CABG at age 66.    She has been seen by GI and allergy and asthma for her symptoms.  She was started on Trelegy, but the patient has not been taking this regularly due to it leaving a bad taste in her mouth.  She has also had her Protonix changed to Prevacid.    Review of Systems:  Positive for chest heaviness  Negative for exertional chest pain, dyspnea with  "exertion, lower extremity edema, palpitations, syncope.     PFSH:  Patient Active Problem List   Diagnosis   • Calculus of gallbladder without cholecystitis without obstruction   • Family history of breast cancer in mother   • Family history of colon cancer   • Irritable bowel syndrome with diarrhea         Current Outpatient Medications:   •  bismuth subsalicylate (PEPTO BISMOL) 262 MG chewable tablet, Chew 524 mg As Needed for Indigestion., Disp: , Rfl:   •  Cetirizine HCl 10 MG capsule, Take  by mouth., Disp: , Rfl:   •  lansoprazole (PREVACID) 30 MG capsule, Take 1 capsule by mouth Daily., Disp: 90 capsule, Rfl: 3  •  Phenylephrine-Acetaminophen (Tylenol Sinus+Headache) 5-325 MG tablet, Take  by mouth As Needed., Disp: , Rfl:   •  albuterol sulfate  (90 Base) MCG/ACT inhaler, Inhale 2 puffs Every 4 (Four) Hours As Needed for Wheezing., Disp: 8 g, Rfl: 2  •  dicyclomine (Bentyl) 10 MG capsule, Take 1 capsule by mouth 4 (Four) Times a Day Before Meals & at Bedtime., Disp: 120 capsule, Rfl: 3  •  Fluticasone-Umeclidin-Vilant (Trelegy Ellipta) 100-62.5-25 MCG/INH inhaler, Inhale 1 puff As Needed., Disp: , Rfl:     Allergies   Allergen Reactions   • Penicillins Rash        reports that she has never smoked. She has never used smokeless tobacco.      Objective:   Physical exam:  /74 (BP Location: Left arm, Patient Position: Sitting, Cuff Size: Adult)   Pulse 81   Ht 175.3 cm (69\")   Wt 74.4 kg (164 lb)   BMI 24.22 kg/m²   CONSTITUTIONAL: No acute distress  RESPIRATORY: Normal effort. Clear to auscultation bilaterally without wheezing or rales  CARDIOVASCULAR: Carotids with normal upstrokes without bruits.  Regular rate and rhythm with normal S1 and S2. Without murmur, gallop or rub. Normal radial pulse. There is no lower extremity edema bilaterally.    Labs:    BUN   Date Value Ref Range Status   10/12/2020 11 6 - 24 mg/dL Final     Creatinine   Date Value Ref Range Status   10/12/2020 0.86 0.57 - " 1.00 mg/dL Final     Potassium   Date Value Ref Range Status   10/12/2020 4.1 3.5 - 5.2 mmol/L Final     ALT (SGPT)   Date Value Ref Range Status   10/12/2020 13 0 - 32 IU/L Final     AST (SGOT)   Date Value Ref Range Status   10/12/2020 17 0 - 40 IU/L Final       No results found for: CHOL  Lab Results   Component Value Date    TRIG 179 (H) 10/12/2020     Lab Results   Component Value Date    HDL 40 10/12/2020     Lab Results   Component Value Date    LDL 93 10/12/2020     No components found for: LDLDIRECTC    Diagnostic Data:      ECG 12 Lead    Date/Time: 12/10/2021 11:51 AM  Performed by: Sonal Moraes APRN  Authorized by: Sonal Moraes APRN   Comparison: compared with previous ECG from 8/14/2019  Similar to previous ECG  Rhythm: sinus rhythm  Rate: normal  BPM: 81  Comments: QRS 81 ms,  ms            Results for orders placed during the hospital encounter of 08/30/19    Adult Transthoracic Echo Complete W/ Cont if Necessary Per Protocol    Interpretation Summary  · Calculated EF = 61%.  · Left ventricular systolic function is normal.  · Trace-to-mild mitral valve regurgitation is present with an eccentric jet noted.      Assessment and Plan:   Diagnoses and all orders for this visit:    Chest pain (Primary)  Familial history of CAD (coronary artery disease)  -Patient with persistent chest heaviness/tightness since February.  -Has been evaluated by GI and allergy and asthma.  She has not seen significant improvement in her symptoms since switching Protonix to Prevacid.  Has not been trialing Trelegy as recommended by allergy and asthma.  -Echocardiogram 8/2019 with a normal EF and trace to mild MR.  -We will proceed with treadmill stress test to evaluate for evidence of ischemia.  -Patient to consider trialing Trelegy as recommended by allergy and asthma.    - Return in about 1 month (around 1/10/2022).    Electronically signed by ERICH Espana, 12/10/21, 11:55 AM EST.

## 2021-12-17 ENCOUNTER — TELEPHONE (OUTPATIENT)
Dept: CARDIOLOGY | Facility: CLINIC | Age: 46
End: 2021-12-17

## 2021-12-17 ENCOUNTER — HOSPITAL ENCOUNTER (OUTPATIENT)
Dept: CARDIOLOGY | Facility: HOSPITAL | Age: 46
Discharge: HOME | End: 2021-12-17
Admitting: NURSE PRACTITIONER

## 2021-12-17 DIAGNOSIS — R07.2 PRECORDIAL CHEST PAIN: ICD-10-CM

## 2021-12-17 DIAGNOSIS — Z82.49 FH: CAD (CORONARY ARTERY DISEASE): ICD-10-CM

## 2021-12-17 LAB
BH CV STRESS BP STAGE 1: NORMAL
BH CV STRESS BP STAGE 2: NORMAL
BH CV STRESS DURATION MIN STAGE 1: 3
BH CV STRESS DURATION MIN STAGE 2: 3
BH CV STRESS DURATION MIN STAGE 3: 160
BH CV STRESS DURATION SEC STAGE 1: 0
BH CV STRESS DURATION SEC STAGE 2: 0
BH CV STRESS DURATION SEC STAGE 3: 30
BH CV STRESS GRADE STAGE 1: 10
BH CV STRESS GRADE STAGE 2: 12
BH CV STRESS GRADE STAGE 3: 14
BH CV STRESS HR STAGE 1: 125
BH CV STRESS HR STAGE 2: 150
BH CV STRESS METS STAGE 1: 5
BH CV STRESS METS STAGE 2: 7.5
BH CV STRESS METS STAGE 3: 10
BH CV STRESS PROTOCOL 1: NORMAL
BH CV STRESS RECOVERY BP: NORMAL MMHG
BH CV STRESS RECOVERY HR: 92 BPM
BH CV STRESS SPEED STAGE 1: 1.7
BH CV STRESS SPEED STAGE 2: 2.5
BH CV STRESS SPEED STAGE 3: 3.4
BH CV STRESS STAGE 1: 1
BH CV STRESS STAGE 2: 2
BH CV STRESS STAGE 3: 3
MAXIMAL PREDICTED HEART RATE: 172 BPM
PERCENT MAX PREDICTED HR: 93.02 %
STRESS BASELINE BP: NORMAL MMHG
STRESS BASELINE HR: 85 BPM
STRESS PERCENT HR: 109 %
STRESS POST ESTIMATED WORKLOAD: 9 METS
STRESS POST EXERCISE DUR MIN: 7 MIN
STRESS POST EXERCISE DUR SEC: 30 SEC
STRESS POST PEAK BP: NORMAL MMHG
STRESS POST PEAK HR: 160 BPM
STRESS TARGET HR: 146 BPM

## 2021-12-17 PROCEDURE — 93018 CV STRESS TEST I&R ONLY: CPT | Performed by: INTERNAL MEDICINE

## 2021-12-17 PROCEDURE — 93017 CV STRESS TEST TRACING ONLY: CPT

## 2021-12-17 PROCEDURE — 93016 CV STRESS TEST SUPVJ ONLY: CPT | Performed by: INTERNAL MEDICINE

## 2021-12-17 NOTE — TELEPHONE ENCOUNTER
Notified pt. She verbalized understanding.    Thank you,    Laenn Yañez, RN  Triage [facility]

## 2021-12-17 NOTE — TELEPHONE ENCOUNTER
Can you let the patient know that her stress test did not show any evidence of ischemia.  She should continue to work with her other providers for non-cardiac sources of her chest pain.

## 2021-12-20 ENCOUNTER — TELEPHONE (OUTPATIENT)
Dept: GASTROENTEROLOGY | Facility: CLINIC | Age: 46
End: 2021-12-20

## 2021-12-20 DIAGNOSIS — K21.9 GASTROESOPHAGEAL REFLUX DISEASE, UNSPECIFIED WHETHER ESOPHAGITIS PRESENT: Primary | ICD-10-CM

## 2021-12-20 DIAGNOSIS — Z12.11 ENCOUNTER FOR SCREENING FOR MALIGNANT NEOPLASM OF COLON: ICD-10-CM

## 2021-12-20 NOTE — TELEPHONE ENCOUNTER
ayden Mallory for 01/12 arrive at 1030-cs, egd/patient procedure packet was mailed out on 12/21 pt was advised time of arrival is subject to change, [facility] will call for w/finale time

## 2021-12-20 NOTE — TELEPHONE ENCOUNTER
ayden Mallory for new date 01/21 arrive at 730-cs, egd/patient procedure packet was mailed out on 12/21 pt was advised time of arrival is subject to change, [facility] will call for w/finale time

## 2021-12-20 NOTE — TELEPHONE ENCOUNTER
Per Dr. Angulo: Proceed with EGD and colonoscopy.    Hospital case request placed for EGD and c/s.

## 2022-01-11 ENCOUNTER — PROCEDURE VISIT (OUTPATIENT)
Dept: OBSTETRICS AND GYNECOLOGY | Facility: CLINIC | Age: 47
End: 2022-01-11

## 2022-01-11 ENCOUNTER — APPOINTMENT (OUTPATIENT)
Dept: WOMENS IMAGING | Facility: HOSPITAL | Age: 47
End: 2022-01-11

## 2022-01-11 ENCOUNTER — OFFICE VISIT (OUTPATIENT)
Dept: OBSTETRICS AND GYNECOLOGY | Facility: CLINIC | Age: 47
End: 2022-01-11

## 2022-01-11 VITALS
HEIGHT: 69 IN | BODY MASS INDEX: 25.03 KG/M2 | SYSTOLIC BLOOD PRESSURE: 101 MMHG | DIASTOLIC BLOOD PRESSURE: 63 MMHG | WEIGHT: 169 LBS

## 2022-01-11 DIAGNOSIS — Z12.31 BREAST CANCER SCREENING BY MAMMOGRAM: ICD-10-CM

## 2022-01-11 DIAGNOSIS — Z12.4 CERVICAL CANCER SCREENING: ICD-10-CM

## 2022-01-11 DIAGNOSIS — Z12.31 VISIT FOR SCREENING MAMMOGRAM: Primary | ICD-10-CM

## 2022-01-11 DIAGNOSIS — Z01.419 WELL WOMAN EXAM WITH ROUTINE GYNECOLOGICAL EXAM: Primary | ICD-10-CM

## 2022-01-11 PROCEDURE — 77067 SCR MAMMO BI INCL CAD: CPT | Performed by: RADIOLOGY

## 2022-01-11 PROCEDURE — 77067 SCR MAMMO BI INCL CAD: CPT | Performed by: STUDENT IN AN ORGANIZED HEALTH CARE EDUCATION/TRAINING PROGRAM

## 2022-01-11 PROCEDURE — 99396 PREV VISIT EST AGE 40-64: CPT | Performed by: STUDENT IN AN ORGANIZED HEALTH CARE EDUCATION/TRAINING PROGRAM

## 2022-01-11 PROCEDURE — 77063 BREAST TOMOSYNTHESIS BI: CPT | Performed by: STUDENT IN AN ORGANIZED HEALTH CARE EDUCATION/TRAINING PROGRAM

## 2022-01-11 PROCEDURE — 77063 BREAST TOMOSYNTHESIS BI: CPT | Performed by: RADIOLOGY

## 2022-01-11 NOTE — PROGRESS NOTES
GYN Annual Exam     CC- Here for annual exam.     Opal Yates is a 46 y.o. female who presents for annual well woman exam. Periods are regular every 28-30 days, lasting 1-5 days. Dysmenorrhea:mild, occurring first 1-2 days of flow. Cyclic symptoms include none. No intermenstrual bleeding, spotting, or discharge.    OB History        3    Para   3    Term   3            AB        Living           SAB        IAB        Ectopic        Molar        Multiple   1    Live Births                    Current contraception: tubal ligation  History of abnormal Pap smear: no  Family history of uterine, colon or ovarian cancer: yes - colon cancer- MGF; sister diagnosed at age 47. MGM had female cancer.  History of abnormal mammogram:yes - 2019- [facility] 0--> diagnostic imaging revealed complicated cyst of the left breast in 2020 and she underwent cyst aspiration that returned benign.  Family history of breast cancer: yes - Mother diagnosed in late 50s ; maternal aunt. Two paternal aunts   Last Pap: 19- NILM   Last Mammogram: 2021- [facility]-1     Past Medical History:   Diagnosis Date   • Calculus of gallbladder without cholecystitis without obstruction 2017   • Gastroesophageal reflux disease 2021   • Hernia     CT scan noted a hiatal hernia.   • Irritable bowel disease    • Irritable bowel syndrome with constipation 2021       Past Surgical History:   Procedure Laterality Date   •  SECTION     • CHOLECYSTECTOMY     • COLONOSCOPY N/A 2017    Procedure: COLONOSCOPY;  Surgeon: Dimitry Arellano MD;  Location: [facility];  Service:    • MAMMO BILATERAL  2016    dr pfeiffer   • PAP SMEAR  2016    dr pfeiffer   • TUBAL ABDOMINAL LIGATION           Current Outpatient Medications:   •  bismuth subsalicylate (PEPTO BISMOL) 262 MG chewable tablet, Chew 524 mg As Needed for Indigestion., Disp: , Rfl:   •  Cetirizine HCl 10 MG capsule, Take  by mouth., Disp: ,  "Rfl:   •  lansoprazole (PREVACID) 30 MG capsule, Take 1 capsule by mouth Daily., Disp: 90 capsule, Rfl: 3  •  Phenylephrine-Acetaminophen (Tylenol Sinus+Headache) 5-325 MG tablet, Take  by mouth As Needed., Disp: , Rfl:     Allergies   Allergen Reactions   • Penicillins Rash       Social History     Tobacco Use   • Smoking status: Never Smoker   • Smokeless tobacco: Never Used   • Tobacco comment: occas  soda   Substance Use Topics   • Alcohol use: Yes     Alcohol/week: 0.0 standard drinks     Comment: Very rarely. Maybe once every few months.   • Drug use: No       Family History   Problem Relation Age of Onset   • Breast cancer Mother    • Heart disease Mother    • Cervical cancer Maternal Grandmother    • Colon cancer Maternal Grandfather         Had a colostomy   • Colon cancer Sister         She was 47 when diagnosed.   • Colon polyps Sister    • Irritable bowel syndrome Sister    • Heart disease Father    • Stroke Father    • Hypertension Father    • Heart disease Maternal Uncle    • Heart disease Paternal Uncle    • Stroke Paternal Grandfather    • Breast cancer Maternal Aunt        Review of Systems   All other systems reviewed and are negative.      Ht 175.3 cm (69.02\")   Wt 76.7 kg (169 lb)   LMP 12/28/2021   BMI 24.95 kg/m²     Physical Exam  Vitals reviewed. Exam conducted with a chaperone present.   Constitutional:       General: She is not in acute distress.  HENT:      Head: Normocephalic and atraumatic.      Right Ear: External ear normal.      Left Ear: External ear normal.   Eyes:      Extraocular Movements: Extraocular movements intact.      Pupils: Pupils are equal, round, and reactive to light.   Cardiovascular:      Rate and Rhythm: Normal rate and regular rhythm.   Pulmonary:      Effort: Pulmonary effort is normal. No respiratory distress.   Chest:   Breasts: Breasts are symmetrical.      Right: No swelling, bleeding, inverted nipple, mass, nipple discharge, skin change, tenderness, " axillary adenopathy or supraclavicular adenopathy.      Left: No swelling, bleeding, inverted nipple, mass, nipple discharge, skin change, tenderness, axillary adenopathy or supraclavicular adenopathy.       Abdominal:      General: There is no distension.      Palpations: Abdomen is soft.      Tenderness: There is no abdominal tenderness. There is no guarding or rebound.   Genitourinary:     General: Normal vulva.      Exam position: Lithotomy position.      Labia:         Right: No rash, tenderness, lesion or injury.         Left: No rash, tenderness, lesion or injury.       Urethra: No prolapse or urethral swelling.      Vagina: Normal. No vaginal discharge, erythema, tenderness, bleeding or lesions.      Cervix: Normal.      Uterus: Normal. Not enlarged, not fixed and not tender.       Adnexa:         Right: No mass, tenderness or fullness.          Left: No mass, tenderness or fullness.     Musculoskeletal:         General: No deformity. Normal range of motion.      Cervical back: Normal range of motion and neck supple.   Lymphadenopathy:      Upper Body:      Right upper body: No supraclavicular or axillary adenopathy.      Left upper body: No supraclavicular or axillary adenopathy.      Lower Body: No right inguinal adenopathy. No left inguinal adenopathy.   Skin:     General: Skin is warm and dry.   Neurological:      General: No focal deficit present.      Mental Status: She is alert and oriented to person, place, and time.   Psychiatric:         Mood and Affect: Mood normal.         Behavior: Behavior normal.       Assessment     1) GYN annual well woman exam.   2) Breast cancer screening  3) Cervical cancer screening      Plan     1) Breast Health - Clinical breast exam yearly and mammogram recommended every 1-2 years during 40s. Self breast awareness monthly. Mammogram performed today.   2) Pap - pap smear with cotesting collected today.   3) Smoking status- non-smoker  4) Activity recommends - Adult  150-300 min/week of multi-component physical activities that include balance training, aerobic and physical strengthening.    5) Contraception- s/p tubal ligation   6) Follow up prn and one year.       Zehra Rice MD

## 2022-01-12 ENCOUNTER — TRANSCRIBE ORDERS (OUTPATIENT)
Dept: ADMINISTRATIVE | Facility: HOSPITAL | Age: 47
End: 2022-01-12

## 2022-01-12 ENCOUNTER — OFFICE VISIT (OUTPATIENT)
Dept: CARDIOLOGY | Facility: CLINIC | Age: 47
End: 2022-01-12

## 2022-01-12 VITALS
HEART RATE: 77 BPM | DIASTOLIC BLOOD PRESSURE: 68 MMHG | HEIGHT: 68 IN | BODY MASS INDEX: 25.61 KG/M2 | WEIGHT: 169 LBS | SYSTOLIC BLOOD PRESSURE: 100 MMHG

## 2022-01-12 DIAGNOSIS — Z01.818 OTHER SPECIFIED PRE-OPERATIVE EXAMINATION: Primary | ICD-10-CM

## 2022-01-12 DIAGNOSIS — R07.2 PRECORDIAL CHEST PAIN: Primary | ICD-10-CM

## 2022-01-12 PROCEDURE — 99213 OFFICE O/P EST LOW 20 MIN: CPT | Performed by: NURSE PRACTITIONER

## 2022-01-12 PROCEDURE — 93000 ELECTROCARDIOGRAM COMPLETE: CPT | Performed by: NURSE PRACTITIONER

## 2022-01-12 RX ORDER — DIPHENOXYLATE HYDROCHLORIDE AND ATROPINE SULFATE 2.5; .025 MG/1; MG/1
TABLET ORAL DAILY
COMMUNITY
End: 2023-01-17

## 2022-01-12 NOTE — PROGRESS NOTES
[facility]   [address], [address]  [place], [place], [address]    [phone]  [facility]           OUTPATIENT CLINIC FOLLOW UP NOTE    Patient Care Team:  Patient Care Team:  Jonas Odonnell MD as PCP - General (Family Medicine)  Gasper Lovell MD as Consulting Physician (Obstetrics and Gynecology)    Subjective:      Chief Complaint   Patient presents with   • Chest Pain       HPI:    Opal Yates is a 46 y.o. female.  Problem list:  1. Chest pain  a. TTE 8/2019: EF 61%, trace to mild MR  b. Treadmill stress test 12/2021: Normal ECG stress test.  2. Palpitations  a. Holter monitor 8/2019: Rare PACs/PVCs  3. Irritable bowel syndrome.    She presents today for follow-up.  Her primary cardiologist is Dr. Benjamin.    Since the patient was last seen she has undergone treadmill stress testing which was normal.  Over the past couple of weeks her chest heaviness/tightness has possibly been improved.  She notes she has noticed it less, but states it never completely goes away.  She has also having right lower abdominal pain.  She is scheduled for an EGD/colonoscopy with GI on the 21st of this month.  She denies shortness of breath, palpitations, lower extremity edema, lightheadedness, or fatigue.  She has been walking without significant difficulty for exercise.    Review of Systems:  Positive for chest heaviness, right lower quadrant abdominal pain  Negative for dyspnea with exertion, lower extremity edema, palpitations, syncope.     PFSH:  Patient Active Problem List   Diagnosis   • Calculus of gallbladder without cholecystitis without obstruction   • Family history of breast cancer in mother   • Family history of colon cancer   • Irritable bowel syndrome with diarrhea   • Gastroesophageal reflux disease   • Encounter for screening for malignant neoplasm of colon         Current Outpatient Medications:   •  bismuth subsalicylate (PEPTO BISMOL) 262 MG chewable tablet,  "Chew 524 mg As Needed for Indigestion., Disp: , Rfl:   •  Cetirizine HCl 10 MG capsule, Take  by mouth., Disp: , Rfl:   •  lansoprazole (PREVACID) 30 MG capsule, Take 1 capsule by mouth Daily., Disp: 90 capsule, Rfl: 3  •  multivitamin (MULTI-VITAMIN DAILY PO), Take  by mouth Daily., Disp: , Rfl:   •  Phenylephrine-Acetaminophen (Tylenol Sinus+Headache) 5-325 MG tablet, Take  by mouth As Needed., Disp: , Rfl:     Allergies   Allergen Reactions   • Penicillins Rash        reports that she has never smoked. She has never used smokeless tobacco.      Objective:   Physical exam:  /68   Pulse 77   Ht 172.7 cm (68\")   Wt 76.7 kg (169 lb)   LMP 12/28/2021   BMI 25.70 kg/m²   CONSTITUTIONAL: No acute distress  RESPIRATORY: Normal effort. Clear to auscultation bilaterally without wheezing or rales  CARDIOVASCULAR: Regular rate and rhythm with normal S1 and S2. Without murmur, gallop or rub.  PERIPHERAL VASCULAR: Normal radial pulse. There is no lower extremity edema bilaterally.    Labs:    BUN   Date Value Ref Range Status   10/12/2020 11 6 - 24 mg/dL Final     Creatinine   Date Value Ref Range Status   10/12/2020 0.86 0.57 - 1.00 mg/dL Final     Potassium   Date Value Ref Range Status   10/12/2020 4.1 3.5 - 5.2 mmol/L Final     ALT (SGPT)   Date Value Ref Range Status   10/12/2020 13 0 - 32 IU/L Final     AST (SGOT)   Date Value Ref Range Status   10/12/2020 17 0 - 40 IU/L Final       No results found for: CHOL  Lab Results   Component Value Date    TRIG 179 (H) 10/12/2020     Lab Results   Component Value Date    HDL 40 10/12/2020     Lab Results   Component Value Date    LDL 93 10/12/2020     No components found for: LDLDIRECTC    Diagnostic Data:      ECG 12 Lead    Date/Time: 1/12/2022 9:44 AM  Performed by: Sonal Moraes APRN  Authorized by: Sonal Moraes APRN   Comparison: compared with previous ECG from 12/10/2021  Similar to previous ECG  Rhythm: sinus rhythm  Rate: normal  BPM: 77  Comments: QRS " 66 ms,  ms            Results for orders placed during the hospital encounter of 08/30/19    Adult Transthoracic Echo Complete W/ Cont if Necessary Per Protocol    Interpretation Summary  · Calculated EF = 61%.  · Left ventricular systolic function is normal.  · Trace-to-mild mitral valve regurgitation is present with an eccentric jet noted.    Treadmill stress 12/2021  · Findings consistent with a normal ECG stress test.    Assessment and Plan:   Diagnoses and all orders for this visit:    Chest pain (Primary)  Familial history of CAD (coronary artery disease)  -Patient with persistent chest heaviness/tightness since February.  This does not worsen with exertion and is constant.  Has been notably less severe over the past couple of weeks.  -Echocardiogram 8/2019 with a normal EF and trace to mild MR.  -Treadmill stress test 12/2021 was within normal limits.  -Patient is scheduled to undergo EGD/colonoscopy on 1/21/2022.  If unremarkable will consider CT of the chest and repeat TTE.    - Return in about 6 months (around 7/12/2022) for Next scheduled follow up with Dr. Benjamin..    Electronically signed by ERICH Espana, 01/12/22, 9:46 AM EST.

## 2022-01-14 ENCOUNTER — TELEPHONE (OUTPATIENT)
Dept: GASTROENTEROLOGY | Facility: CLINIC | Age: 47
End: 2022-01-14

## 2022-01-14 DIAGNOSIS — Z01.818 PRE-OP TESTING: Primary | ICD-10-CM

## 2022-01-15 LAB
CYTOLOGIST CVX/VAG CYTO: NORMAL
CYTOLOGY CVX/VAG DOC CYTO: NORMAL
CYTOLOGY CVX/VAG DOC THIN PREP: NORMAL
DX ICD CODE: NORMAL
HIV 1 & 2 AB SER-IMP: NORMAL
HPV I/H RISK 4 DNA CVX QL PROBE+SIG AMP: NEGATIVE
OTHER STN SPEC: NORMAL
STAT OF ADQ CVX/VAG CYTO-IMP: NORMAL

## 2022-01-18 ENCOUNTER — TELEPHONE (OUTPATIENT)
Dept: GASTROENTEROLOGY | Facility: CLINIC | Age: 47
End: 2022-01-18

## 2022-01-18 DIAGNOSIS — Z01.818 PRE-OP TESTING: Primary | ICD-10-CM

## 2022-01-19 ENCOUNTER — LAB (OUTPATIENT)
Dept: LAB | Facility: HOSPITAL | Age: 47
End: 2022-01-19

## 2022-01-19 DIAGNOSIS — Z01.818 OTHER SPECIFIED PRE-OPERATIVE EXAMINATION: ICD-10-CM

## 2022-01-19 LAB — SARS-COV-2 ORF1AB RESP QL NAA+PROBE: NOT DETECTED

## 2022-01-19 PROCEDURE — C9803 HOPD COVID-19 SPEC COLLECT: HCPCS

## 2022-01-19 PROCEDURE — U0004 COV-19 TEST NON-CDC HGH THRU: HCPCS

## 2022-01-21 ENCOUNTER — HOSPITAL ENCOUNTER (OUTPATIENT)
Facility: HOSPITAL | Age: 47
Setting detail: HOSPITAL OUTPATIENT SURGERY
Discharge: HOME | End: 2022-01-21
Attending: INTERNAL MEDICINE | Admitting: INTERNAL MEDICINE

## 2022-01-21 ENCOUNTER — ANESTHESIA (OUTPATIENT)
Dept: GASTROENTEROLOGY | Facility: HOSPITAL | Age: 47
End: 2022-01-21

## 2022-01-21 ENCOUNTER — ANESTHESIA EVENT (OUTPATIENT)
Dept: GASTROENTEROLOGY | Facility: HOSPITAL | Age: 47
End: 2022-01-21

## 2022-01-21 VITALS
OXYGEN SATURATION: 98 % | DIASTOLIC BLOOD PRESSURE: 56 MMHG | RESPIRATION RATE: 16 BRPM | HEART RATE: 66 BPM | WEIGHT: 166.2 LBS | SYSTOLIC BLOOD PRESSURE: 103 MMHG | BODY MASS INDEX: 24.62 KG/M2 | HEIGHT: 69 IN | TEMPERATURE: 98.5 F

## 2022-01-21 DIAGNOSIS — K21.9 GASTROESOPHAGEAL REFLUX DISEASE, UNSPECIFIED WHETHER ESOPHAGITIS PRESENT: ICD-10-CM

## 2022-01-21 DIAGNOSIS — Z12.11 ENCOUNTER FOR SCREENING FOR MALIGNANT NEOPLASM OF COLON: ICD-10-CM

## 2022-01-21 LAB
B-HCG UR QL: NEGATIVE
EXPIRATION DATE: NORMAL
INTERNAL NEGATIVE CONTROL: NEGATIVE
INTERNAL POSITIVE CONTROL: POSITIVE
Lab: NORMAL

## 2022-01-21 PROCEDURE — 43239 EGD BIOPSY SINGLE/MULTIPLE: CPT | Performed by: INTERNAL MEDICINE

## 2022-01-21 PROCEDURE — 88305 TISSUE EXAM BY PATHOLOGIST: CPT | Performed by: INTERNAL MEDICINE

## 2022-01-21 PROCEDURE — 45378 DIAGNOSTIC COLONOSCOPY: CPT | Performed by: INTERNAL MEDICINE

## 2022-01-21 PROCEDURE — 25010000002 PROPOFOL 10 MG/ML EMULSION: Performed by: ANESTHESIOLOGY

## 2022-01-21 PROCEDURE — S0260 H&P FOR SURGERY: HCPCS | Performed by: INTERNAL MEDICINE

## 2022-01-21 PROCEDURE — 81025 URINE PREGNANCY TEST: CPT | Performed by: INTERNAL MEDICINE

## 2022-01-21 RX ORDER — LIDOCAINE HYDROCHLORIDE 20 MG/ML
INJECTION, SOLUTION INFILTRATION; PERINEURAL AS NEEDED
Status: DISCONTINUED | OUTPATIENT
Start: 2022-01-21 | End: 2022-01-21 | Stop reason: SURG

## 2022-01-21 RX ORDER — SODIUM CHLORIDE 0.9 % (FLUSH) 0.9 %
3 SYRINGE (ML) INJECTION EVERY 12 HOURS SCHEDULED
Status: DISCONTINUED | OUTPATIENT
Start: 2022-01-21 | End: 2022-01-21 | Stop reason: HOSPADM

## 2022-01-21 RX ORDER — SODIUM CHLORIDE 0.9 % (FLUSH) 0.9 %
10 SYRINGE (ML) INJECTION AS NEEDED
Status: DISCONTINUED | OUTPATIENT
Start: 2022-01-21 | End: 2022-01-21 | Stop reason: HOSPADM

## 2022-01-21 RX ORDER — PROPOFOL 10 MG/ML
VIAL (ML) INTRAVENOUS AS NEEDED
Status: DISCONTINUED | OUTPATIENT
Start: 2022-01-21 | End: 2022-01-21 | Stop reason: SURG

## 2022-01-21 RX ORDER — PROPOFOL 10 MG/ML
VIAL (ML) INTRAVENOUS CONTINUOUS PRN
Status: DISCONTINUED | OUTPATIENT
Start: 2022-01-21 | End: 2022-01-21 | Stop reason: SURG

## 2022-01-21 RX ORDER — SODIUM CHLORIDE, SODIUM LACTATE, POTASSIUM CHLORIDE, CALCIUM CHLORIDE 600; 310; 30; 20 MG/100ML; MG/100ML; MG/100ML; MG/100ML
30 INJECTION, SOLUTION INTRAVENOUS CONTINUOUS PRN
Status: DISCONTINUED | OUTPATIENT
Start: 2022-01-21 | End: 2022-01-21 | Stop reason: HOSPADM

## 2022-01-21 RX ADMIN — LIDOCAINE HYDROCHLORIDE 60 MG: 20 INJECTION, SOLUTION INFILTRATION; PERINEURAL at 08:51

## 2022-01-21 RX ADMIN — PROPOFOL 160 MCG/KG/MIN: 10 INJECTION, EMULSION INTRAVENOUS at 08:51

## 2022-01-21 RX ADMIN — Medication 70 MG: at 08:51

## 2022-01-21 RX ADMIN — SODIUM CHLORIDE, POTASSIUM CHLORIDE, SODIUM LACTATE AND CALCIUM CHLORIDE 30 ML/HR: 600; 310; 30; 20 INJECTION, SOLUTION INTRAVENOUS at 08:29

## 2022-01-21 NOTE — BRIEF OP NOTE
ESOPHAGOGASTRODUODENOSCOPY, COLONOSCOPY  Progress Note    Opal Yates  1/21/2022    Pre-op Diagnosis:   Gastroesophageal reflux disease, unspecified whether esophagitis present [K21.9]  Encounter for screening for malignant neoplasm of colon [Z12.11]       Post-Op Diagnosis Codes:     * Gastroesophageal reflux disease, unspecified whether esophagitis present [K21.9]     * Encounter for screening for malignant neoplasm of colon [Z12.11]     * Internal hemorrhoids [K64.8]     * Gastritis [K29.70]    Procedure/CPT® Codes:        Procedure(s):  ESOPHAGOGASTRODUODENOSCOPY with biopsies  COLONOSCOPY into cecum and terminal ileum    Surgeon(s):  Latrell Angulo MD    Anesthesia: Monitored Anesthesia Care    Staff:   Endo Technician: Farzana Copeland  Endo Nurse: pOhelia Banda RN         Estimated Blood Loss: minimal    Urine Voided: * No values recorded between 1/21/2022  8:46 AM and 1/21/2022  9:14 AM *    Specimens:                Specimens     ID Source Type Tests Collected By Collected At Frozen?    A Stomach Tissue · TISSUE PATHOLOGY EXAM   Latrell Angulo MD 1/21/22 0915 No    Description: Antrum Biopsies                Drains: * No LDAs found *    Findings: Colonoscopy to the terminal ileum with normal ileum mucosa.  Internal hemorrhoids were identified but the remainder the colonic mucosa was normal.  EGD with normal esophageal mucosa throughout moderate antral gastritis was noted biopsies obtained.  Retroflexed view the GE junction was normal as was the duodenum.    Complications: None          Latrell Angulo MD     Date: 1/21/2022  Time: 09:16 EST

## 2022-01-21 NOTE — H&P
[facility]  Pre Procedure History & Physical    Chief Complaint:   GERD and family history colon cancer    Subjective     HPI:   Patient 46-year-old female with history of IBS with reflux and atypical chest pain with negative cardiac work-up. Patient here for EGD and colonoscopy    Past Medical History:   Past Medical History:   Diagnosis Date   • Calculus of gallbladder without cholecystitis without obstruction 2017   • Gastroesophageal reflux disease 2021   • Hernia     CT scan noted a hiatal hernia.   • Irritable bowel disease    • Irritable bowel syndrome with constipation 2021       Past Surgical History:  Past Surgical History:   Procedure Laterality Date   •  SECTION     • CHOLECYSTECTOMY     • COLONOSCOPY N/A 2017    Procedure: COLONOSCOPY;  Surgeon: Dimitry Arellano MD;  Location: [facility] ENDOSCOPY;  Service:    • MAMMO BILATERAL  2016    dr pfeiffer   • PAP SMEAR  2016    dr pfeiffer   • TUBAL ABDOMINAL LIGATION         Family History:  Family History   Problem Relation Age of Onset   • Breast cancer Mother    • Heart disease Mother    • Cervical cancer Maternal Grandmother    • Colon cancer Maternal Grandfather         Had a colostomy   • Colon cancer Sister         She was 47 when diagnosed.   • Colon polyps Sister    • Irritable bowel syndrome Sister    • Heart disease Father    • Stroke Father    • Hypertension Father    • Heart disease Maternal Uncle    • Heart disease Paternal Uncle    • Stroke Paternal Grandfather    • Breast cancer Maternal Aunt    • Malig Hyperthermia Neg Hx        Social History:   reports that she has never smoked. She has never used smokeless tobacco. She reports current alcohol use. She reports that she does not use drugs.    Medications:   Medications Prior to Admission   Medication Sig Dispense Refill Last Dose   • Cetirizine HCl 10 MG capsule Take  by mouth.   Past Week at Unknown time   • multivitamin  "(MULTI-VITAMIN DAILY PO) Take  by mouth Daily.   Past Week at Unknown time   • bismuth subsalicylate (PEPTO BISMOL) 262 MG chewable tablet Chew 524 mg As Needed for Indigestion.   Unknown at Unknown time   • lansoprazole (PREVACID) 30 MG capsule Take 1 capsule by mouth Daily. 90 capsule 3 Unknown at Unknown time   • Phenylephrine-Acetaminophen (Tylenol Sinus+Headache) 5-325 MG tablet Take  by mouth As Needed.   Unknown at Unknown time       Allergies:  Penicillins    ROS:    Pertinent items are noted in HPI     Objective     Blood pressure 108/71, pulse 79, temperature 98.5 °F (36.9 °C), temperature source Oral, resp. rate 13, height 175.3 cm (69\"), weight 75.4 kg (166 lb 3.2 oz), last menstrual period 12/28/2021, SpO2 99 %.    Physical Exam   Constitutional: Pt is oriented to person, place, and time and well-developed, well-nourished, and in no distress.   Mouth/Throat: Oropharynx is clear and moist.   Neck: Normal range of motion.   Cardiovascular: Normal rate, regular rhythm and normal heart sounds.    Pulmonary/Chest: Effort normal and breath sounds normal.   Abdominal: Soft. Nontender  Skin: Skin is warm and dry.   Psychiatric: Mood, memory, affect and judgment normal.     Assessment/Plan     Diagnosis:  GERD  Family history colon cancer    Anticipated Surgical Procedure:  EGD and colonoscopy    The risks, benefits, and alternatives of this procedure have been discussed with the patient or the responsible party- the patient understands and agrees to proceed.                                                          "

## 2022-01-21 NOTE — DISCHARGE INSTRUCTIONS

## 2022-01-21 NOTE — ANESTHESIA POSTPROCEDURE EVALUATION
"Patient: Opal Yates    Procedure Summary     Date: 01/21/22 Room / Location:  [facility]    Anesthesia Start: 0846 Anesthesia Stop: 0920    Procedures:       ESOPHAGOGASTRODUODENOSCOPY with biopsies (N/A Esophagus)      COLONOSCOPY into cecum and terminal ileum (N/A ) Diagnosis:       Gastroesophageal reflux disease, unspecified whether esophagitis present      Encounter for screening for malignant neoplasm of colon      Internal hemorrhoids      Gastritis      (Gastroesophageal reflux disease, unspecified whether esophagitis present [K21.9])      (Encounter for screening for malignant neoplasm of colon [Z12.11])    Surgeons: Latrell Angulo MD Provider: James Judge MD    Anesthesia Type: MAC ASA Status: 1          Anesthesia Type: MAC    Vitals  Vitals Value Taken Time   /56 01/21/22 0942   Temp     Pulse 66 01/21/22 0942   Resp 16 01/21/22 0942   SpO2 98 % 01/21/22 0942           Post Anesthesia Care and Evaluation    Patient location during evaluation: bedside  Patient participation: complete - patient participated  Level of consciousness: awake and alert  Pain management: adequate  Airway patency: patent  Anesthetic complications: No anesthetic complications    Cardiovascular status: acceptable  Respiratory status: acceptable  Hydration status: acceptable    Comments: /56 (BP Location: Left arm, Patient Position: Lying)   Pulse 66   Temp 36.9 °C (98.5 °F) (Oral)   Resp 16   Ht 175.3 cm (69\")   Wt 75.4 kg (166 lb 3.2 oz)   [facility] 12/28/2021   SpO2 98%   BMI 24.54 kg/m²           "

## 2022-01-21 NOTE — ANESTHESIA PREPROCEDURE EVALUATION
Anesthesia Evaluation     Patient summary reviewed and Nursing notes reviewed   no history of anesthetic complications:  NPO Solid Status: > 6 hours  NPO Liquid Status: > 6 hours           Airway   Mallampati: II  TM distance: >3 FB  Neck ROM: full  no difficulty expected and No difficulty expected  Dental - normal exam     Pulmonary - negative pulmonary ROS and normal exam    breath sounds clear to auscultation  (-) rhonchi, decreased breath sounds, wheezes, rales, stridor  Cardiovascular - negative cardio ROS and normal exam    NYHA Classification: I  Rhythm: regular  Rate: normal    (-) murmur, weak pulses, friction rub, systolic click, carotid bruits, JVD, peripheral edema      Neuro/Psych- negative ROS  GI/Hepatic/Renal/Endo    (+)  GERD,      Musculoskeletal (-) negative ROS    Abdominal  - normal exam    Abdomen: soft.   Substance History - negative use     OB/GYN negative ob/gyn ROS         Other - negative ROS                       Anesthesia Plan    ASA 1     MAC     intravenous induction     Anesthetic plan, all risks, benefits, and alternatives have been provided, discussed and informed consent has been obtained with: patient.        CODE STATUS:

## 2022-01-24 LAB
LAB AP CASE REPORT: NORMAL
PATH REPORT.FINAL DX SPEC: NORMAL
PATH REPORT.GROSS SPEC: NORMAL

## 2022-01-31 ENCOUNTER — TELEPHONE (OUTPATIENT)
Dept: GASTROENTEROLOGY | Facility: CLINIC | Age: 47
End: 2022-01-31

## 2022-02-08 ENCOUNTER — TELEPHONE (OUTPATIENT)
Dept: GASTROENTEROLOGY | Facility: CLINIC | Age: 47
End: 2022-02-08

## 2022-02-08 DIAGNOSIS — R10.31 RIGHT LOWER QUADRANT ABDOMINAL PAIN: Primary | ICD-10-CM

## 2022-02-08 NOTE — TELEPHONE ENCOUNTER
----- Message from Latrell Angulo MD sent at 2/8/2022  8:07 AM EST -----  Regarding: FW: Lower Right Abdominal Pain      ----- Message -----  From: Kristi Figueroa RN  Sent: 2/4/2022  10:12 AM EST  To: Latrell Angulo MD  Subject: FW: Lower Right Abdominal Pain                     ----- Message -----  From: Opal Yates  Sent: 2/3/2022   5:10 PM EST  To: [facility]  Subject: Lower Right Abdominal Pain                       While I am relieved that the colonoscopy and endoscopy did not show any signs of cancer or more serious conditions, I am still having pain in my lower right abdomen. My bowel movements have been pretty normal so it would not seem to be from constipation. Do you have any thoughts on other potential causes for this pain?

## 2022-02-09 ENCOUNTER — TELEPHONE (OUTPATIENT)
Dept: GASTROENTEROLOGY | Facility: CLINIC | Age: 47
End: 2022-02-09

## 2022-02-09 NOTE — TELEPHONE ENCOUNTER
My Chart Message: The  called and got me scheduled for a CT scan today but the  told me it would likely cost me $1,000 out of pocket. I know [facility] on [facility] only charges about $400 for a CT scan. Would Dr. Angulo be willing to send the order to [facility] instead?

## 2022-02-17 ENCOUNTER — TELEPHONE (OUTPATIENT)
Dept: GASTROENTEROLOGY | Facility: CLINIC | Age: 47
End: 2022-02-17

## 2022-02-17 NOTE — TELEPHONE ENCOUNTER
Received MRI results from [facility].   Scanned into the patient's chart under media tab. Update sent to Dr. Angulo.

## 2022-02-21 ENCOUNTER — APPOINTMENT (OUTPATIENT)
Dept: CT IMAGING | Facility: HOSPITAL | Age: 47
End: 2022-02-21

## 2022-02-21 NOTE — TELEPHONE ENCOUNTER
Josie Thomas RegSched Rep  P Mg[facility] Pool  Phone Number: [phone]     Results from ct scan

## 2022-02-24 DIAGNOSIS — Z00.00 ANNUAL PHYSICAL EXAM: Primary | ICD-10-CM

## 2022-02-26 LAB
ALBUMIN SERPL-MCNC: 4.6 G/DL (ref 3.8–4.8)
ALBUMIN/GLOB SERPL: 1.9 {RATIO} (ref 1.2–2.2)
ALP SERPL-CCNC: 63 IU/L (ref 44–121)
ALT SERPL-CCNC: 15 IU/L (ref 0–32)
AST SERPL-CCNC: 15 IU/L (ref 0–40)
BASOPHILS # BLD AUTO: 0 X10E3/UL (ref 0–0.2)
BASOPHILS NFR BLD AUTO: 1 %
BILIRUB SERPL-MCNC: 0.7 MG/DL (ref 0–1.2)
BUN SERPL-MCNC: 11 MG/DL (ref 6–24)
BUN/CREAT SERPL: 13 (ref 9–23)
CALCIUM SERPL-MCNC: 9.3 MG/DL (ref 8.7–10.2)
CHLORIDE SERPL-SCNC: 104 MMOL/L (ref 96–106)
CHOLEST SERPL-MCNC: 192 MG/DL (ref 100–199)
CO2 SERPL-SCNC: 23 MMOL/L (ref 20–29)
CREAT SERPL-MCNC: 0.87 MG/DL (ref 0.57–1)
EOSINOPHIL # BLD AUTO: 0.1 X10E3/UL (ref 0–0.4)
EOSINOPHIL NFR BLD AUTO: 1 %
ERYTHROCYTE [DISTWIDTH] IN BLOOD BY AUTOMATED COUNT: 12 % (ref 11.7–15.4)
GLOBULIN SER CALC-MCNC: 2.4 G/DL (ref 1.5–4.5)
GLUCOSE SERPL-MCNC: 91 MG/DL (ref 65–99)
HCT VFR BLD AUTO: 43.5 % (ref 34–46.6)
HDLC SERPL-MCNC: 40 MG/DL
HGB BLD-MCNC: 14.5 G/DL (ref 11.1–15.9)
IMM GRANULOCYTES # BLD AUTO: 0 X10E3/UL (ref 0–0.1)
IMM GRANULOCYTES NFR BLD AUTO: 0 %
LDLC SERPL CALC-MCNC: 129 MG/DL (ref 0–99)
LYMPHOCYTES # BLD AUTO: 2.3 X10E3/UL (ref 0.7–3.1)
LYMPHOCYTES NFR BLD AUTO: 37 %
MCH RBC QN AUTO: 30.5 PG (ref 26.6–33)
MCHC RBC AUTO-ENTMCNC: 33.3 G/DL (ref 31.5–35.7)
MCV RBC AUTO: 91 FL (ref 79–97)
MONOCYTES # BLD AUTO: 0.6 X10E3/UL (ref 0.1–0.9)
MONOCYTES NFR BLD AUTO: 9 %
NEUTROPHILS # BLD AUTO: 3.2 X10E3/UL (ref 1.4–7)
NEUTROPHILS NFR BLD AUTO: 52 %
PLATELET # BLD AUTO: 178 X10E3/UL (ref 150–450)
POTASSIUM SERPL-SCNC: 4.2 MMOL/L (ref 3.5–5.2)
PROT SERPL-MCNC: 7 G/DL (ref 6–8.5)
RBC # BLD AUTO: 4.76 X10E6/UL (ref 3.77–5.28)
SODIUM SERPL-SCNC: 139 MMOL/L (ref 134–144)
TRIGL SERPL-MCNC: 130 MG/DL (ref 0–149)
TSH SERPL DL<=0.005 MIU/L-ACNC: 2.54 UIU/ML (ref 0.45–4.5)
VLDLC SERPL CALC-MCNC: 23 MG/DL (ref 5–40)
WBC # BLD AUTO: 6.2 X10E3/UL (ref 3.4–10.8)

## 2022-02-26 NOTE — PROGRESS NOTES
Your labs look good, although the HDL (good cholesterol) needs to be at least 50.  Keep exercising and watching your diet closely.

## 2022-03-15 ENCOUNTER — OFFICE VISIT (OUTPATIENT)
Dept: FAMILY MEDICINE CLINIC | Facility: CLINIC | Age: 47
End: 2022-03-15

## 2022-03-15 VITALS
HEART RATE: 83 BPM | DIASTOLIC BLOOD PRESSURE: 60 MMHG | TEMPERATURE: 97.5 F | WEIGHT: 166 LBS | OXYGEN SATURATION: 97 % | SYSTOLIC BLOOD PRESSURE: 100 MMHG | BODY MASS INDEX: 24.59 KG/M2 | HEIGHT: 69 IN

## 2022-03-15 DIAGNOSIS — K21.9 GASTROESOPHAGEAL REFLUX DISEASE, UNSPECIFIED WHETHER ESOPHAGITIS PRESENT: Primary | ICD-10-CM

## 2022-03-15 PROCEDURE — 99213 OFFICE O/P EST LOW 20 MIN: CPT

## 2022-03-15 RX ORDER — PANTOPRAZOLE SODIUM 40 MG/1
TABLET, DELAYED RELEASE ORAL
COMMUNITY
Start: 2022-02-05 | End: 2022-05-19

## 2022-03-15 NOTE — PROGRESS NOTES
Chief Complaint  Chest Pain (3 days)    Subjective          Opal Yates presents to [facility]    History of Present Illness    Opal Yates 46 y.o. female who presents today for reports of upper chest discomfort that occurs when she has heartburn. This has occurred the past three nights and wakes her up. Treatment to date: she walks around then takes Mylanta and Ibuprofen with improvement in symptoms after around one hour. She was prescribed Lansoprazole 30 mg and Pantoprazole 40 mg, but only takes it as needed. She took Pantoprazole yesterday and the day before, but she has not taken the medication for several weeks.  She has also been eating a lot of grapefruit recently.  She denies sharp chest pain, shortness of breath, palpitations, dizziness, lightheadedness, weakness, near syncope, or syncope.    The patient had a CT of the abdomen and pelvis on 02/14/2022 that showed no acute process, borderline hepatomegaly, and to correlate with liver function test.  The patient has already followed up on this with her PCP Dr. Odonnell, Cardiology, and has followed up several times with her established Gastroenterologist Dr. Latrell Angulo.  The patient had a colonoscopy performed on 01/21/2022 that showed some nonbleeding internal hemorrhoids and otherwise normal exam.  Repeat colonoscopy was recommended for 5 years.     The patient's last chest x-ray on 11/03/2021 showed no evidence of infiltrate, effusion, or congestive failure.  The patient had an echocardiogram in August 2019 with a normal ejection fraction and trace to mild MR, a treadmill stress test in December 2021 that was within normal limits; both of these were addressed at her appointment with Cardiology on 01/12/2022.  The patient has a follow-up appointment with Dr. Benjamin, Cardiology on 07/12/2022.    The patient had an upper GI endoscopy performed on 01/21/2022 that showed a normal esophagus and duodenum, and an  "erythematous mucosa in the antrum that was biopsied.  The biopsy report showed no metaplasia, dysplasia, and no malignancy identified; focal changes suggestive of mild reactive gastropathy, and negative for Helicobacter by routine staining.  The patient has followed up with Gastroenterology regarding results.    She  denies medication side effects.    The following data was reviewed by: ERICH Lehman on 03/15/2022:  SCANNED - IMAGING (02/14/2022)  XR Chest 2 View (11/04/2021 15:42)  COLONOSCOPY (01/21/2022 08:42)  Office Visit with Sonal Moraes APRN (01/12/2022)  ECG 12 Lead (01/12/2022 09:44)  Treadmill Stress Test (12/17/2021 12:40)    Objective     Vital Signs:   /60   Pulse 83   Temp 97.5 °F (36.4 °C)   Ht 175 cm (68.9\")   Wt 75.3 kg (166 lb)   SpO2 97%   BMI 24.59 kg/m²      Review of Systems   Respiratory: Positive for chest tightness (chest discomfort that occurs as a result of heartburn). Negative for shortness of breath.    Gastrointestinal: Positive for GERD and indigestion. Negative for anal bleeding, blood in stool, nausea, rectal pain and vomiting.   Musculoskeletal: Negative for back pain and myalgias.         Physical Exam  Vitals and nursing note reviewed.   Constitutional:       General: She is not in acute distress.     Appearance: Normal appearance. She is well-developed. She is not ill-appearing, toxic-appearing or diaphoretic.   HENT:      Head: Normocephalic.      Right Ear: External ear normal.      Left Ear: External ear normal.   Eyes:      General: No scleral icterus.     Pupils: Pupils are equal, round, and reactive to light.   Neck:      Thyroid: No thyromegaly.      Vascular: No carotid bruit.   Cardiovascular:      Rate and Rhythm: Normal rate and regular rhythm.      Pulses: Normal pulses.      Heart sounds: Normal heart sounds.   Pulmonary:      Effort: Pulmonary effort is normal. No respiratory distress.      Breath sounds: Normal breath sounds. No stridor. "   Abdominal:      General: Abdomen is flat. Bowel sounds are normal. There is no distension. There are no signs of injury.      Palpations: Abdomen is soft.      Tenderness: There is no abdominal tenderness. There is no right CVA tenderness, left CVA tenderness, guarding or rebound.   Musculoskeletal:         General: No deformity.      Cervical back: Normal range of motion and neck supple.      Right lower leg: No edema.      Left lower leg: No edema.   Skin:     General: Skin is warm.      Coloration: Skin is not jaundiced.   Neurological:      General: No focal deficit present.      Mental Status: She is alert and oriented to person, place, and time.   Psychiatric:         Behavior: Behavior normal.         Thought Content: Thought content normal.         Judgment: Judgment normal.                     Assessment and Plan      Diagnoses and all orders for this visit:    1. Gastroesophageal reflux disease, unspecified whether esophagitis present (Primary)  Comments:  Start taking Pantoprazole 40 mg daily  Information given regarding dietary and lifestyle modifications related to GERD  F/U with Dr. Angulo if no improvement            Follow Up     Return if symptoms worsen or fail to improve.    Patient was given instructions and counseling regarding her condition or for health maintenance advice. Please see specific information pulled into the AVS if appropriate.     -Start taking Pantoprazole 40 mg daily, avoid high acidic foods, eat small and frequent meals, do not eat large meals, do not lay down for 3 hours after the last meal of the evening, and and wood inserts under headboard for slight elevation of head.  Written patient information was given to the patient today regarding all of these instructions.  -Follow-up with Dr. Angulo, Gastroenterology if no improvement in symptoms.

## 2022-03-28 ENCOUNTER — TELEPHONE (OUTPATIENT)
Dept: CARDIOLOGY | Facility: CLINIC | Age: 47
End: 2022-03-28

## 2022-03-28 ENCOUNTER — PATIENT MESSAGE (OUTPATIENT)
Dept: CARDIOLOGY | Facility: CLINIC | Age: 47
End: 2022-03-28

## 2022-03-28 DIAGNOSIS — R07.2 PRECORDIAL CHEST PAIN: Primary | ICD-10-CM

## 2022-03-28 DIAGNOSIS — Z82.49 FH: CAD (CORONARY ARTERY DISEASE): ICD-10-CM

## 2022-03-28 DIAGNOSIS — I34.0 NONRHEUMATIC MITRAL VALVE REGURGITATION: ICD-10-CM

## 2022-03-28 NOTE — TELEPHONE ENCOUNTER
Can you let the patient know that I have ordered a CT scan of her chest and an echo to further evaluate her chest discomfort.

## 2022-03-28 NOTE — TELEPHONE ENCOUNTER
----- Message from Kanika Nicole MA sent at 3/28/2022  8:08 AM EDT -----  Regarding: FW: Chest Pressure    ----- Message -----  From: Opal Yates  Sent: 3/28/2022   7:56 AM EDT  To: Mark Ortega [facility]  Subject: Chest Pressure                                   Good morning,    I wanted to follow up with you to see what would be next steps in determining what is causing my chest pressure. The endoscopy and colonoscopy I had done earlier this year did not provide any answers. I had a follow up abdominal CT scan for some abdominal pain I have been having but the only thing noted on that was that my liver was little on the large side. The gastroenterologist was not concerned about that.     So, none of the tests that have been performed have determined a cause for this ongoing chest pressure. This pressure has been going on for a year now with no explanation so I am wondering what other potential causes there could be.

## 2022-04-06 ENCOUNTER — HOSPITAL ENCOUNTER (OUTPATIENT)
Dept: CARDIOLOGY | Facility: HOSPITAL | Age: 47
Discharge: HOME | End: 2022-04-06
Admitting: NURSE PRACTITIONER

## 2022-04-06 DIAGNOSIS — R07.2 PRECORDIAL CHEST PAIN: ICD-10-CM

## 2022-04-06 DIAGNOSIS — I34.0 NONRHEUMATIC MITRAL VALVE REGURGITATION: ICD-10-CM

## 2022-04-06 LAB
ASCENDING AORTA: 3.2 CM
BH CV ECHO MEAS - ACS: 2.06 CM
BH CV ECHO MEAS - AO MAX PG: 5.5 MMHG
BH CV ECHO MEAS - AO MEAN PG: 3.2 MMHG
BH CV ECHO MEAS - AO ROOT DIAM: 3.3 CM
BH CV ECHO MEAS - AO V2 MAX: 117 CM/SEC
BH CV ECHO MEAS - AO V2 VTI: 25.4 CM
BH CV ECHO MEAS - AVA(I,D): 2.03 CM2
BH CV ECHO MEAS - EDV(CUBED): 66.7 ML
BH CV ECHO MEAS - EDV(MOD-SP2): 78 ML
BH CV ECHO MEAS - EDV(MOD-SP4): 73 ML
BH CV ECHO MEAS - EF(MOD-BP): 61.6 %
BH CV ECHO MEAS - EF(MOD-SP2): 61.5 %
BH CV ECHO MEAS - EF(MOD-SP4): 60.3 %
BH CV ECHO MEAS - ESV(CUBED): 20.5 ML
BH CV ECHO MEAS - ESV(MOD-SP2): 30 ML
BH CV ECHO MEAS - ESV(MOD-SP4): 29 ML
BH CV ECHO MEAS - FS: 32.5 %
BH CV ECHO MEAS - IVS/LVPW: 1.1 CM
BH CV ECHO MEAS - IVSD: 0.89 CM
BH CV ECHO MEAS - LAT PEAK E' VEL: 13.9 CM/SEC
BH CV ECHO MEAS - LV DIASTOLIC VOL/BSA (35-75): 38.3 CM2
BH CV ECHO MEAS - LV MASS(C)D: 103.4 GRAMS
BH CV ECHO MEAS - LV MAX PG: 2.38 MMHG
BH CV ECHO MEAS - LV MEAN PG: 1.42 MMHG
BH CV ECHO MEAS - LV SYSTOLIC VOL/BSA (12-30): 15.2 CM2
BH CV ECHO MEAS - LV V1 MAX: 77.1 CM/SEC
BH CV ECHO MEAS - LV V1 VTI: 16.1 CM
BH CV ECHO MEAS - LVIDD: 4.1 CM
BH CV ECHO MEAS - LVIDS: 2.7 CM
BH CV ECHO MEAS - LVOT AREA: 3.2 CM2
BH CV ECHO MEAS - LVOT DIAM: 2.02 CM
BH CV ECHO MEAS - LVPWD: 0.81 CM
BH CV ECHO MEAS - MED PEAK E' VEL: 9.7 CM/SEC
BH CV ECHO MEAS - MR MAX PG: 138 MMHG
BH CV ECHO MEAS - MR MAX VEL: 587.4 CM/SEC
BH CV ECHO MEAS - MV A DUR: 0.09 SEC
BH CV ECHO MEAS - MV A MAX VEL: 60.4 CM/SEC
BH CV ECHO MEAS - MV DEC SLOPE: 442.6 CM/SEC2
BH CV ECHO MEAS - MV DEC TIME: 0.17 MSEC
BH CV ECHO MEAS - MV E MAX VEL: 71.6 CM/SEC
BH CV ECHO MEAS - MV E/A: 1.19
BH CV ECHO MEAS - MV MAX PG: 3.3 MMHG
BH CV ECHO MEAS - MV MEAN PG: 1.65 MMHG
BH CV ECHO MEAS - MV V2 VTI: 22.1 CM
BH CV ECHO MEAS - MVA(VTI): 2.33 CM2
BH CV ECHO MEAS - PA ACC TIME: 0.16 SEC
BH CV ECHO MEAS - PA PR(ACCEL): 5.3 MMHG
BH CV ECHO MEAS - PA V2 MAX: 66 CM/SEC
BH CV ECHO MEAS - PULM A REVS DUR: 0.1 SEC
BH CV ECHO MEAS - PULM A REVS VEL: 25.7 CM/SEC
BH CV ECHO MEAS - PULM DIAS VEL: 75.8 CM/SEC
BH CV ECHO MEAS - PULM SYS VEL: 53.1 CM/SEC
BH CV ECHO MEAS - RV MAX PG: 1.04 MMHG
BH CV ECHO MEAS - RV V1 MAX: 51 CM/SEC
BH CV ECHO MEAS - RV V1 VTI: 12.5 CM
BH CV ECHO MEAS - RVOT DIAM: 1.89 CM
BH CV ECHO MEAS - SI(MOD-SP2): 25.2 ML/M2
BH CV ECHO MEAS - SI(MOD-SP4): 23.1 ML/M2
BH CV ECHO MEAS - SV(LVOT): 51.6 ML
BH CV ECHO MEAS - SV(MOD-SP2): 48 ML
BH CV ECHO MEAS - SV(MOD-SP4): 44 ML
BH CV ECHO MEAS - SV(RVOT): 35.2 ML
BH CV ECHO MEAS - TAPSE (>1.6): 2.5 CM
BH CV ECHO MEASUREMENTS AVERAGE E/E' RATIO: 6.07
BH CV XLRA - TDI S': 13.2 CM/SEC
LEFT ATRIUM VOLUME INDEX: 12.7 ML/M2
MAXIMAL PREDICTED HEART RATE: 172 BPM
SINUS: 3 CM
STJ: 2.6 CM
STRESS TARGET HR: 146 BPM

## 2022-04-06 PROCEDURE — 93306 TTE W/DOPPLER COMPLETE: CPT | Performed by: INTERNAL MEDICINE

## 2022-04-06 PROCEDURE — 93306 TTE W/DOPPLER COMPLETE: CPT

## 2022-04-11 RX ORDER — PANTOPRAZOLE SODIUM 40 MG/1
TABLET, DELAYED RELEASE ORAL
Qty: 30 TABLET | OUTPATIENT
Start: 2022-04-11

## 2022-05-03 ENCOUNTER — HOSPITAL ENCOUNTER (OUTPATIENT)
Dept: CT IMAGING | Facility: HOSPITAL | Age: 47
Discharge: HOME | End: 2022-05-03

## 2022-05-05 ENCOUNTER — OFFICE VISIT (OUTPATIENT)
Dept: FAMILY MEDICINE CLINIC | Facility: CLINIC | Age: 47
End: 2022-05-05

## 2022-05-05 VITALS
BODY MASS INDEX: 25.18 KG/M2 | TEMPERATURE: 98.9 F | RESPIRATION RATE: 16 BRPM | WEIGHT: 170 LBS | OXYGEN SATURATION: 98 % | DIASTOLIC BLOOD PRESSURE: 60 MMHG | HEIGHT: 69 IN | HEART RATE: 90 BPM | SYSTOLIC BLOOD PRESSURE: 98 MMHG

## 2022-05-05 DIAGNOSIS — J01.00 ACUTE NON-RECURRENT MAXILLARY SINUSITIS: ICD-10-CM

## 2022-05-05 DIAGNOSIS — Z00.00 ANNUAL PHYSICAL EXAM: Primary | ICD-10-CM

## 2022-05-05 PROCEDURE — 99396 PREV VISIT EST AGE 40-64: CPT | Performed by: FAMILY MEDICINE

## 2022-05-05 RX ORDER — METHYLPREDNISOLONE 4 MG/1
TABLET ORAL
Qty: 21 TABLET | Refills: 0 | Status: SHIPPED | OUTPATIENT
Start: 2022-05-05 | End: 2022-05-19

## 2022-05-05 RX ORDER — DOXYCYCLINE HYCLATE 100 MG
100 TABLET ORAL 2 TIMES DAILY
Qty: 20 TABLET | Refills: 0 | Status: SHIPPED | OUTPATIENT
Start: 2022-05-05 | End: 2022-05-19

## 2022-05-05 NOTE — PROGRESS NOTES
"Subjective   Opal Yates is a 46 y.o. female.     CC: Annual Exam    History of Present Illness     Opal Yates 46 y.o. female who presents for an Annual Wellness Visit.  she has a history of   Patient Active Problem List   Diagnosis   • Calculus of gallbladder without cholecystitis without obstruction   • Family history of breast cancer in mother   • Family history of colon cancer   • Irritable bowel syndrome with diarrhea   • Gastroesophageal reflux disease   • Encounter for screening for malignant neoplasm of colon   .  she has been feeling well.   I  reviewed health maintenance with her as part of my preventative care plan.  Pt sees GYN and mammograms and exams are UTD.  The following portions of the patient's history were reviewed and updated as appropriate: allergies, current medications, past family history, past medical history, past social history, past surgical history and problem list.    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 Taking Zyrtec for AR.     Review of Systems   Constitutional: Negative for appetite change, fever and unexpected weight change.   HENT: Negative for ear pain, facial swelling and sore throat.    Eyes: Negative for pain and visual disturbance.   Respiratory: Negative for chest tightness, shortness of breath and wheezing.    Cardiovascular: Negative for chest pain and palpitations.   Gastrointestinal: Negative for abdominal pain and blood in stool.   Endocrine: Negative.    Genitourinary: Negative for difficulty urinating and hematuria.   Musculoskeletal: Negative for joint swelling.   Neurological: Negative for tremors, seizures and syncope.   Hematological: Negative for adenopathy.   Psychiatric/Behavioral: Negative.        BP 98/60 (BP Location: Left arm, Patient Position: Sitting, Cuff Size: Adult)   Pulse 90   Temp 98.9 °F (37.2 °C) (Skin)   Resp 16   Ht 175.3 cm (69\")   Wt 77.1 kg (170 lb)   LMP 04/27/2022 " (Approximate)   SpO2 98%   BMI 25.10 kg/m²     Objective   Physical Exam  Vitals and nursing note reviewed.   Constitutional:       Appearance: She is well-developed.   HENT:      Head: Normocephalic and atraumatic.      Right Ear: External ear normal.      Left Ear: External ear normal.      Mouth/Throat:      Pharynx: No oropharyngeal exudate.   Eyes:      General: No scleral icterus.     Conjunctiva/sclera: Conjunctivae normal.      Pupils: Pupils are equal, round, and reactive to light.   Neck:      Thyroid: No thyromegaly.   Cardiovascular:      Rate and Rhythm: Normal rate and regular rhythm.      Heart sounds: No murmur heard.    No gallop.   Pulmonary:      Effort: Pulmonary effort is normal.      Breath sounds: Normal breath sounds. No wheezing or rales.   Abdominal:      General: Bowel sounds are normal. There is no distension.      Palpations: Abdomen is soft. There is no mass.      Tenderness: There is no abdominal tenderness.      Hernia: No hernia is present.   Musculoskeletal:         General: No tenderness or deformity. Normal range of motion.      Cervical back: Normal range of motion and neck supple.   Lymphadenopathy:      Cervical: No cervical adenopathy.   Skin:     General: Skin is warm and dry.      Findings: No rash.   Neurological:      Mental Status: She is alert and oriented to person, place, and time.      Deep Tendon Reflexes: Reflexes are normal and symmetric.   Psychiatric:         Behavior: Behavior normal.     Labs reviewed with pt today during visit. All questions answered.  Crystal PEREZpresent for exam.    Assessment/Plan   Diagnoses and all orders for this visit:    1. Annual physical exam (Primary)    2. Acute non-recurrent maxillary sinusitis  -     methylPREDNISolone (Medrol) 4 MG dose pack; follow package directions  Dispense: 21 tablet; Refill: 0  -     doxycycline (VIBRAMYICN) 100 MG tablet; Take 1 tablet by mouth 2 (Two) Times a Day.  Dispense: 20 tablet; Refill: 0    Healthy  diet and exercise discussed.

## 2022-05-19 ENCOUNTER — OFFICE VISIT (OUTPATIENT)
Dept: FAMILY MEDICINE CLINIC | Facility: CLINIC | Age: 47
End: 2022-05-19

## 2022-05-19 VITALS
BODY MASS INDEX: 25.18 KG/M2 | HEART RATE: 86 BPM | WEIGHT: 170 LBS | SYSTOLIC BLOOD PRESSURE: 98 MMHG | TEMPERATURE: 97.8 F | DIASTOLIC BLOOD PRESSURE: 63 MMHG | HEIGHT: 69 IN | RESPIRATION RATE: 16 BRPM

## 2022-05-19 DIAGNOSIS — S39.011A STRAIN OF ABDOMINAL MUSCLE, INITIAL ENCOUNTER: Primary | ICD-10-CM

## 2022-05-19 PROCEDURE — 99213 OFFICE O/P EST LOW 20 MIN: CPT | Performed by: FAMILY MEDICINE

## 2022-05-19 RX ORDER — SUCRALFATE 1 G/1
1 TABLET ORAL 4 TIMES DAILY
Qty: 40 TABLET | Refills: 1 | Status: CANCELLED | OUTPATIENT
Start: 2022-05-19

## 2022-05-19 RX ORDER — NAPROXEN 500 MG/1
500 TABLET ORAL 2 TIMES DAILY WITH MEALS
Qty: 30 TABLET | Refills: 1 | Status: SHIPPED | OUTPATIENT
Start: 2022-05-19 | End: 2023-02-24

## 2022-05-19 NOTE — PROGRESS NOTES
Subjective   Opal Yates is a 46 y.o. female.     CC: Chronic Epigastric/Chest Pain    History of Present Illness     Pt with complicated PMH related to chest/abdominal pain comes in today to discuss some increased epigastric pain issues. Last seen here by Jeannie in March and that HPI was as follows:    Opal Yates 46 y.o. female who presents today for reports of upper chest discomfort that occurs when she has heartburn. This has occurred the past three nights and wakes her up. Treatment to date: she walks around then takes Mylanta and Ibuprofen with improvement in symptoms after around one hour. She was prescribed Lansoprazole 30 mg and Pantoprazole 40 mg, but only takes it as needed. She took Pantoprazole yesterday and the day before, but she has not taken the medication for several weeks. She has also been eating a lot of grapefruit recently. She denies sharp chest pain, shortness of breath, palpitations, dizziness, lightheadedness, weakness, near syncope, or syncope.   The patient had a CT of the abdomen and pelvis on 02/14/2022 that showed no acute process, borderline hepatomegaly, and to correlate with liver function test. The patient has already followed up on this with her PCP Dr. Odonnell, Cardiology, and has followed up several times with her established Gastroenterologist Dr. Latrell Angulo. The patient had a colonoscopy performed on 01/21/2022 that showed some nonbleeding internal hemorrhoids and otherwise normal exam. Repeat colonoscopy was recommended for 5 years.   The patient's last chest x-ray on 11/03/2021 showed no evidence of infiltrate, effusion, or congestive failure. The patient had an echocardiogram in August 2019 with a normal ejection fraction and trace to mild MR, a treadmill stress test in December 2021 that was within normal limits; both of these were addressed at her appointment with Cardiology on 01/12/2022. The patient has a follow-up appointment with Dr. Benjamin, Cardiology on  07/12/2022.   The patient had an upper GI endoscopy performed on 01/21/2022 that showed a normal esophagus and duodenum, and an erythematous mucosa in the antrum that was biopsied. The biopsy report showed no metaplasia, dysplasia, and no malignancy identified; focal changes suggestive of mild reactive gastropathy, and negative for Helicobacter by routine staining. The patient has followed up with Gastroenterology regarding results.    1. Gastroesophageal reflux disease, unspecified whether esophagitis present (Primary)   Comments:   Start taking Pantoprazole 40 mg daily   Information given regarding dietary and lifestyle modifications related to GERD   F/U with Dr. Angulo if no improvement  She messaged her GI office on 3/28 with this message:    I wanted to follow up with you to see what would be next steps in determining what is causing my chest pressure. The endoscopy and colonoscopy I had done earlier this year did not provide any answers. I had a follow up abdominal CT scan for some abdominal pain I have been having but the only thing noted on that was that my liver was little on the large side. The gastroenterologist was not concerned about that.   So, none of the tests that have been performed have determined a cause for this ongoing chest pressure. This pressure has been going on for a year now with no explanation so I am wondering what other potential causes there could be.  They were then going to order a chest CT, but due to several other CTs she's had and being concerned about excessive radiation, they elected to then order a Cardiac MRI instead.    She messaged me on 2/24 with this message:    I wanted to get your advice on next steps. As you know I have seen a multitude of doctors over the past year for various issues. One issue was the intermittent pain in my abdomen and the other is this constant chest pressure.      I have had two abdominal CT scans with no significant results for the abdominal  "pain and nothing really showed up on the colonoscopy/endoscopy so I am going to drop that for now unless it gets worse.     So now I am still trying to determine the cause of this chronic chest pressure. I passed the cardiac stress test and EKG. The echo showed some leaky valve issues but they did not think that would cause a pressure feeling. They ordered a heart CT and switched that to an MRI because I am trying to limit my radiation exposure. However, they said the tests they have ordered will only look at the heart. Since the other heart tests have come back okay, it is probably likely that my issue is not cardiac related so I hesitant to get a heart scan if it will only look at the heart and not any other chest tissue.     It would be great if there was a test that could show issues with any organs or tissue in my chest rather than just looking at one organ at a time. Is there a general chest MRI or something like that, that could look at any possible causes in my chest?       Today, pt reports a 2 day h/o some LUQ/Epigastric/RUQ discomfort that is worse with movement. Is getting over a URI and has been coughing a lot (better). No change with eating and no change to the bowels.     The following portions of the patient's history were reviewed and updated as appropriate: allergies, current medications, past family history, past medical history, past social history, past surgical history and problem list.    Review of Systems   Constitutional: Negative for activity change, chills and fever.   Respiratory: Negative for cough.    Cardiovascular: Negative for chest pain.   Psychiatric/Behavioral: Negative for dysphoric mood.       Blood pressure 98/63, pulse 86, temperature 97.8 °F (36.6 °C), temperature source Oral, resp. rate 16, height 175 cm (68.9\"), weight 77.1 kg (170 lb), last menstrual period 04/27/2022.      Objective   Physical Exam  Constitutional:       General: She is not in acute distress.     " Appearance: She is well-developed.   Pulmonary:      Effort: Pulmonary effort is normal.   Abdominal:          Comments: Tender to light palpation   Neurological:      Mental Status: She is alert and oriented to person, place, and time.   Psychiatric:         Behavior: Behavior normal.         Thought Content: Thought content normal.     Crystal PEREZpresent for exam.    Assessment & Plan   Diagnoses and all orders for this visit:    1. Strain of abdominal muscle, initial encounter (Primary)  -     naproxen (Naprosyn) 500 MG tablet; Take 1 tablet by mouth 2 (Two) Times a Day With Meals.  Dispense: 30 tablet; Refill: 1    Heat/stretching discussed.

## 2022-05-21 ENCOUNTER — PATIENT MESSAGE (OUTPATIENT)
Dept: CARDIOLOGY | Facility: CLINIC | Age: 47
End: 2022-05-21

## 2022-05-24 ENCOUNTER — TELEPHONE (OUTPATIENT)
Dept: CARDIOLOGY | Facility: CLINIC | Age: 47
End: 2022-05-24

## 2022-05-24 NOTE — TELEPHONE ENCOUNTER
Pt called and said she needs to cx her CTA tomorrow because her ins will not cover it if it's done at the hospital and she doesn't know where to go.    I provided pt with the number to central scheduling and told her to call and cx her appt with them and then call her ins and find out a list of approved places to have the CTA and call our office back.  Pt verbalized understanding./prt    *see pt mychart msg as well

## 2022-05-25 ENCOUNTER — HOSPITAL ENCOUNTER (OUTPATIENT)
Dept: CT IMAGING | Facility: HOSPITAL | Age: 47
End: 2022-05-25

## 2022-05-26 NOTE — TELEPHONE ENCOUNTER
Pt called and LVM stating she has found a free-standing facility that is going to do the CTA and it will be covered under her insurance. They just need the order faxed over. I have faxed the order to [facility] (Fax [phone]). Copy of confirmation will be scanned to pt's chart. // HG

## 2022-06-03 ENCOUNTER — TELEPHONE (OUTPATIENT)
Dept: CARDIOLOGY | Facility: CLINIC | Age: 47
End: 2022-06-03

## 2022-06-03 NOTE — TELEPHONE ENCOUNTER
Pt called and she went for CTA-Coronary today and her pulse was too high to get clear images.  Pt was told she will need a beta-blocker to slow her pulse.  78-82 and they want it below 64.  Please advise.

## 2022-06-03 NOTE — TELEPHONE ENCOUNTER
Per pt via [employer]: Since I run a lower blood pressure is that going to cause any issues with my blood pressure going too low? It was 108/77 earlier today but sometimes it runs 100/60

## 2022-06-09 ENCOUNTER — TELEPHONE (OUTPATIENT)
Dept: CARDIOLOGY | Facility: CLINIC | Age: 47
End: 2022-06-09

## 2022-06-09 NOTE — TELEPHONE ENCOUNTER
Pt has been following up with aliza and she wants pt to have a CTA- [facility] done, but can't due to her pulse being too high. They were talking about a beat-blocker to slow her pulse rate down, but the patient is really wanting to see you to make sure everything will be okay. Is there any spot I can put her at on your schedule to be seen sooner then what she is scheduled for on 07/12?    Thank you,  Heather

## 2022-06-24 ENCOUNTER — TELEMEDICINE (OUTPATIENT)
Dept: FAMILY MEDICINE CLINIC | Facility: CLINIC | Age: 47
End: 2022-06-24

## 2022-06-24 VITALS — BODY MASS INDEX: 24.29 KG/M2 | HEIGHT: 69 IN | WEIGHT: 164 LBS

## 2022-06-24 DIAGNOSIS — R05.9 COUGH: ICD-10-CM

## 2022-06-24 DIAGNOSIS — U07.1 COVID-19 VIRUS DETECTED: Primary | ICD-10-CM

## 2022-06-24 DIAGNOSIS — R53.83 OTHER FATIGUE: ICD-10-CM

## 2022-06-24 DIAGNOSIS — R50.9 LOW GRADE FEVER: ICD-10-CM

## 2022-06-24 PROCEDURE — 99213 OFFICE O/P EST LOW 20 MIN: CPT

## 2022-06-24 RX ORDER — DOXYCYCLINE HYCLATE 100 MG/1
100 CAPSULE ORAL 2 TIMES DAILY
Qty: 20 CAPSULE | Refills: 0 | Status: SHIPPED | OUTPATIENT
Start: 2022-06-24 | End: 2022-07-04

## 2022-06-24 RX ORDER — MONTELUKAST SODIUM 10 MG/1
10 TABLET ORAL NIGHTLY
Qty: 14 TABLET | Refills: 0 | Status: SHIPPED | OUTPATIENT
Start: 2022-06-24 | End: 2022-10-18

## 2022-06-24 RX ORDER — BENZONATATE 100 MG/1
100 CAPSULE ORAL 3 TIMES DAILY PRN
Qty: 30 CAPSULE | Refills: 1 | Status: SHIPPED | OUTPATIENT
Start: 2022-06-24 | End: 2022-10-18

## 2022-06-24 NOTE — PROGRESS NOTES
Mode of Visit: Video  You have chosen to receive care through a telehealth visit.  Do you consent to use a video/audio connection for your medical care today? Yes   The visit included audio and video interaction. No technical issues occurred during this visit.    Subjective       Chief Complaint   Patient presents with   • Cough     COVID positive this am, c/o cough, fatigue, sinus drainage x 2 days,  also has COVID          HPI:        Opal is a 46 y.o. female who presents to  [address] [facility] today.    Opal Yates 46 y.o. female who presents for evaluation of sinus drainage, cough, fatigue, and tested positive for Covid-19. Symptoms include post nasal drip, cough described as productive of clear sputum, fever and fatigue.  Onset of symptoms was 2 days ago, stable since that time. Patient denies shortness of breath, wheezing, upper respiratory congestion, hemoptysis, pleuritic chest pain, dyspnea on exertion, ear drainage, eye discharge, diarrhea, vomiting, vertigo, sweats, epistaxis, high fever, and joint pain.   Evaluation to date: none Treatment to date:  none.     The patient tested positive for COVID-19 this morning.  She has received 3 COVID vaccines.  She had a low-grade fever this morning of 99.0 F. She did not take Tylenol. She is drinking plenty of fluid, and is staying hydrated.  She denies shortness of breath, wheezing, and chest pain.    Review of Systems   Constitutional: Positive for fatigue and fever (low-grade). Negative for chills and diaphoresis.   HENT: Positive for postnasal drip. Negative for congestion, ear discharge, ear pain, facial swelling, hearing loss, nosebleeds, sinus pressure, sore throat, tinnitus, trouble swallowing and voice change.    Eyes: Negative for visual disturbance.   Respiratory: Positive for cough (productive, clear sputum). Negative for apnea, chest tightness, shortness of breath and wheezing.   "  Cardiovascular: Negative for chest pain and palpitations.   Gastrointestinal: Negative for abdominal pain, constipation, diarrhea, nausea and vomiting.   Genitourinary: Negative for dysuria, frequency and urgency.   Musculoskeletal: Negative for back pain.   Skin: Negative for rash.   Neurological: Negative for dizziness, syncope, weakness, light-headedness and numbness.   Psychiatric/Behavioral: Negative for behavioral problems and sleep disturbance.            PE:   Objective   Vitals:    06/24/22 1121   Weight: 74.4 kg (164 lb)   Height: 175.3 cm (69\")        Body mass index is 24.22 kg/m².    Physical Exam   Constitutional: She appears well-developed and well-nourished. No distress.   HENT:   Head: Normocephalic.   Neck: Neck normal appearance.  Pulmonary/Chest: Effort normal.  No respiratory distress.  No respiratory distress.  Pulmonary effort was normal.  The patient was talking without difficulty.   Neurological: She is alert.   Psychiatric: She has a normal mood and affect. She mood appears normal.             The following data was reviewed by: ERICH Lehman on 06/24/2022:                A/P:     Assessment & Plan   Diagnoses and all orders for this visit:    1. COVID-19 virus detected (Primary)  -     doxycycline (VIBRAMYCIN) 100 MG capsule; Take 1 capsule by mouth 2 (Two) Times a Day for 10 days.  Dispense: 20 capsule; Refill: 0  -     montelukast (Singulair) 10 MG tablet; Take 1 tablet by mouth Every Night.  Dispense: 14 tablet; Refill: 0  -     benzonatate (Tessalon Perles) 100 MG capsule; Take 1 capsule by mouth 3 (Three) Times a Day As Needed for Cough.  Dispense: 30 capsule; Refill: 1    2. Cough  -     doxycycline (VIBRAMYCIN) 100 MG capsule; Take 1 capsule by mouth 2 (Two) Times a Day for 10 days.  Dispense: 20 capsule; Refill: 0  -     montelukast (Singulair) 10 MG tablet; Take 1 tablet by mouth Every Night.  Dispense: 14 tablet; Refill: 0  -     benzonatate (Tessalon Perles) 100 MG " capsule; Take 1 capsule by mouth 3 (Three) Times a Day As Needed for Cough.  Dispense: 30 capsule; Refill: 1    3. Other fatigue    4. Low grade fever        I spent 16 minutes caring for Opal on this date of service. This time includes time spent by me in the following activities:preparing for the visit, obtaining and/or reviewing a separately obtained history, performing a medically appropriate examination and/or evaluation , counseling and educating the patient/family/caregiver, ordering medications, tests, or procedures, documenting information in the medical record and independently interpreting results and communicating that information with the patient/family/caregiver     Follow up:   Return if symptoms worsen or fail to improve.      -Take medication as prescribed.  -Covid test was positive this morning.   -Monitor for fever and take Tylenol as needed.  Drink plenty of fluids and get plenty of rest.  -Use cool-mist humidifier as needed.  -I recommend an over-the-counter vitamin regimen to boost immune system of the following: Vitamin D3 5,000 IU daily, vitamin C 500-1,000 mg twice daily, Quercetin 250 mg twice daily, Zinc 100 mg/day, and Melatonin 5-10 mg before bedtime.  Purchase a pulse oximeter at any local pharmacy to monitor oxygen saturations.  Call 911 if you have shortness of breath, sharp chest pain, decreased oxygen saturations, sharp pain in your back, or a fever that will not come down by Tylenol.  -Remain in quarantine for 10 days from symptom onset.  -The patient verbalized understanding of all instructions given today.

## 2022-06-28 ENCOUNTER — HOSPITAL ENCOUNTER (OUTPATIENT)
Dept: CT IMAGING | Facility: HOSPITAL | Age: 47
Discharge: HOME | End: 2022-06-28

## 2022-07-06 NOTE — PROGRESS NOTES
07/18/22 0001   Pre-Procedure Phone Call   Procedure Time Verified Yes   Arrival Time 1300   Procedure Location Verified Yes   Medical History Reviewed No   NPO Status Reinforced Yes   Ride and Caregiver Arranged N/A   Patient Knows to Bring Current Medications   (No changes in medications since last reviewed)   Bring Outside Films Requested   (Dr Domingo to read)

## 2022-07-12 ENCOUNTER — OFFICE VISIT (OUTPATIENT)
Dept: CARDIOLOGY | Facility: CLINIC | Age: 47
End: 2022-07-12

## 2022-07-12 VITALS
BODY MASS INDEX: 24.44 KG/M2 | HEART RATE: 96 BPM | WEIGHT: 165 LBS | OXYGEN SATURATION: 99 % | HEIGHT: 69 IN | SYSTOLIC BLOOD PRESSURE: 100 MMHG | DIASTOLIC BLOOD PRESSURE: 70 MMHG

## 2022-07-12 DIAGNOSIS — R07.89 CHEST PRESSURE: Primary | ICD-10-CM

## 2022-07-12 PROCEDURE — 99214 OFFICE O/P EST MOD 30 MIN: CPT | Performed by: INTERNAL MEDICINE

## 2022-07-12 NOTE — PROGRESS NOTES
"      CARDIOLOGY    Carlos Benjamin MD    ENCOUNTER DATE:  07/12/2022    Opal Yates / 46 y.o. / female        CHIEF COMPLAINT / REASON FOR OFFICE VISIT     Precordial chest pain (01/12/2022 Follow up)      HISTORY OF PRESENT ILLNESS       HPI  Opal Yates is a 46 y.o. female who presents today for reevaluation.  Patient continues to have chest pressure.  Unfortunately she has not gotten her CT scan.  Initially insurance did not approve it but then they approved it as a stand-alone center.  Her heart rate would not come down enough that they were incapable of giving her IV medication.  She then got changed back to the hospital to have the study performed but then she developed COVID so once again since been postponed from there.  With exception of chest pressure she is otherwise doing okay.  She stopped working out because of the COVID diagnosis but she started back.  She has been to GI she has had issues with irritable bowel in the past she has been scoped from above and below with no significant findings.  Her irritable bowel is better overall her heartburn is also better with treatment from her GI physician.  She is also seeing pulmonology she was given some inhalers empirically again with no change in her symptoms.      The following portions of the patient's history were reviewed and updated as appropriate: allergies, current medications, past family history, past medical history, past social history, past surgical history and problem list.      VITAL SIGNS     Visit Vitals  /70 (BP Location: Left arm)   Pulse 96   Ht 175.3 cm (69\")   Wt 74.8 kg (165 lb)   LMP 06/19/2022   SpO2 99%   BMI 24.37 kg/m²         Wt Readings from Last 3 Encounters:   07/12/22 74.8 kg (165 lb)   06/24/22 74.4 kg (164 lb)   05/19/22 77.1 kg (170 lb)     Body mass index is 24.37 kg/m².      REVIEW OF SYSTEMS   ROS        PHYSICAL EXAMINATION     Vitals reviewed.   Constitutional:       Appearance: Healthy appearance. "   Pulmonary:      Effort: Pulmonary effort is normal.   Cardiovascular:      Normal rate. Regular rhythm. Normal S1. Normal S2.      Murmurs: There is no murmur.      No gallop. No click. No rub.   Pulses:     Intact distal pulses.   Edema:     Peripheral edema absent.   Neurological:      Mental Status: Alert and oriented to person, place and time.           REVIEWED DATA     Procedures    Cardiac Procedures:  1.     Lipid Panel    Lipid Panel 2/25/22   Total Cholesterol 192   Triglycerides 130   HDL Cholesterol 40   VLDL Cholesterol 23   LDL Cholesterol  129 (A)   (A) Abnormal value                ASSESSMENT & PLAN      Diagnosis Plan   1. Chest pressure           SUMMARY/DISCUSSION  1. Patient continues to have episodes of chest pressure.  CT scan is planned.  2. Mild to moderate aortic regurgitation.  Currently stable blood pressures on the low side which is great.  3. If her chest CT is unremarkable and not sure what more to do.  I will follow clinically and see what evolves.        MEDICATIONS         Discharge Medications          Accurate as of July 12, 2022  9:06 AM. If you have any questions, ask your nurse or doctor.            Continue These Medications      Instructions Start Date   benzonatate 100 MG capsule  Commonly known as: Tessalon Perles   100 mg, Oral, 3 Times Daily PRN      Cetirizine HCl 10 MG capsule   Oral      lansoprazole 30 MG capsule  Commonly known as: PREVACID   30 mg, Oral, Daily      montelukast 10 MG tablet  Commonly known as: Singulair   10 mg, Oral, Nightly      multivitamin tablet tablet   Oral, Daily      naproxen 500 MG tablet  Commonly known as: Naprosyn   500 mg, Oral, 2 Times Daily With Meals                 **Dragon Disclaimer:   Much of this encounter note is an electronic transcription/translation of spoken language to printed text. The electronic translation of spoken language may permit erroneous, or at times, nonsensical words or phrases to be inadvertently  transcribed. Although I have reviewed the note for such errors, some may still exist.

## 2022-07-14 RX ORDER — METOPROLOL SUCCINATE 25 MG/1
25 TABLET, EXTENDED RELEASE ORAL DAILY
Qty: 2 TABLET | Refills: 0 | Status: SHIPPED | OUTPATIENT
Start: 2022-07-14 | End: 2023-02-24

## 2022-07-18 ENCOUNTER — HOSPITAL ENCOUNTER (OUTPATIENT)
Dept: CT IMAGING | Facility: HOSPITAL | Age: 47
Discharge: HOME | End: 2022-07-18
Admitting: NURSE PRACTITIONER

## 2022-07-18 VITALS
HEIGHT: 68 IN | OXYGEN SATURATION: 100 % | WEIGHT: 162 LBS | SYSTOLIC BLOOD PRESSURE: 104 MMHG | HEART RATE: 66 BPM | BODY MASS INDEX: 24.55 KG/M2 | DIASTOLIC BLOOD PRESSURE: 72 MMHG | TEMPERATURE: 97.5 F | RESPIRATION RATE: 16 BRPM

## 2022-07-18 DIAGNOSIS — Z82.49 FH: CAD (CORONARY ARTERY DISEASE): ICD-10-CM

## 2022-07-18 DIAGNOSIS — R07.2 PRECORDIAL CHEST PAIN: ICD-10-CM

## 2022-07-18 LAB — QT INTERVAL: 411 MS

## 2022-07-18 PROCEDURE — 93005 ELECTROCARDIOGRAM TRACING: CPT | Performed by: NURSE PRACTITIONER

## 2022-07-18 PROCEDURE — 0 IOPAMIDOL PER 1 ML: Performed by: NURSE PRACTITIONER

## 2022-07-18 PROCEDURE — 75574 CT ANGIO HRT W/3D IMAGE: CPT | Performed by: INTERNAL MEDICINE

## 2022-07-18 PROCEDURE — 75574 CT ANGIO HRT W/3D IMAGE: CPT

## 2022-07-18 RX ADMIN — IOPAMIDOL 95 ML: 755 INJECTION, SOLUTION INTRAVENOUS at 14:13

## 2022-07-18 NOTE — NURSING NOTE
Patient arrived in Radiology Triage [place] [facility] for CTA.    Protective goggles and mask in place with all patient interactions today.

## 2022-07-18 NOTE — NURSING NOTE
Patient ambulated self to Entrance A for discharge.    Protective goggles and mask in place with all patient interactions today.

## 2022-07-18 NOTE — NURSING NOTE
MARY KAY Zelaya from [facility] called back to report it is okay to proceed for cardiac CTA.

## 2022-07-25 ENCOUNTER — DOCUMENTATION (OUTPATIENT)
Dept: CARDIOLOGY | Facility: CLINIC | Age: 47
End: 2022-07-25

## 2022-07-25 NOTE — PROGRESS NOTES
Cardiac CTA with morphology  7/18/22  Reason for the exam: CP    Calcium score is 0 Agatston units.  This is between the 1-25 percentile for women between the ages of 45-49 years old.    Heart rate 65 bpm.  Left ventricular end-diastolic volume 138 mL.  Left ventricular end-systolic volume 59 mL.  Ejection fraction 58%.  Stroke volume 80 mL.  Cardiac output 5179 mL/m    The right atrium is normal in size.  The right ventricle is normal in size.  There is grossly normal right ventricular systolic function.  The pulmonary artery is normal in size.  There are 4 pulmonary veins which enter the left atrium in their expected location.  The left atrial appendage was visualized and is without thrombus.  The intra-atrial septum appeared to be intact.  The left ventricle is normal in size with normal systolic function.  There was no evidence of a left ventricular thrombus.  The intraventricular septum appeared to be intact.  The mitral valve appeared structurally normal.  The aortic valve was trileaflet and appears structurally and functionally normal.  The pulmonic valve appeared structurally normal.  The tricuspid valve appeared structurally normal.  There was no pericardial effusion.  The pericardium appeared normal.    The left main coronary artery came off the left coronary cusp in its anticipated location.  It bifurcated into the left anterior descending artery and the circumflex coronary artery.  There was no evidence of atherosclerotic disease of the left main coronary artery.  Left anterior descending artery wraps around the apex of the heart.  There is no evidence of atherosclerotic disease.  The circumflex artery is the nondominant vessel with no evidence of atherosclerotic disease.  The right coronary artery is the dominant vessel with no evidence of atherosclerotic disease.    Conclusions:  1.  Normal coronary artery anatomy without evidence of atherosclerotic disease.  Calcium score is 0 Agatston units.  2.   Structurally normal heart.    Yvette Domingo MD  07/25/22

## 2022-10-18 NOTE — PROGRESS NOTES
"Subjective   Opal Yates is a 47 y.o. female.     CC: Management of Chronic Chest Pressure and Recent Lab Tests    History of Present Illness     Pt returns today after sending this message yesterday:    In my ongoing quest to determine the cause of the chest pressure I went to see my orthopedic doctor to see if it could be related to spinal/muscular issues.     He did some X-rays and I had an MRI on my thoracic spine. The X-rays showed some disc narrowing and what seems to be a surgical clip in my right abdomen (probably from tubal, although I don't know why it would be there since they cut and burned the tubes). The MRI showed some disc bulging. He also ran a series of blood tests. Everything came back normal except that I tested positive for auto-antibiodies. How concerned should I be about that? I don't have a follow-up with Dr. Polo until October 27 because he is on vacation. The labs are attached. Can these be added to my [facility] MyChart? What would be the logical follow-up steps on the positive WINTER?    Her 10/6/22 visit to Ortho was also independently reviewed by me at today's visit.    Pt reports this sensation she has \"doesn't hurt\" but is \"pressure\" and always there. Somewhat of a \"tingling sensation\", at times.    The following portions of the patient's history were reviewed and updated as appropriate: allergies, current medications, past family history, past medical history, past social history, past surgical history and problem list.    Review of Systems   Constitutional: Negative for activity change, chills and fever.   Respiratory: Negative for cough.    Cardiovascular: Negative for chest pain.   Psychiatric/Behavioral: Negative for dysphoric mood.       /63   Pulse 82   Temp 97.5 °F (36.4 °C) (Oral)   Resp 16   Ht 172.7 cm (68\")   Wt 72.6 kg (160 lb)   BMI 24.33 kg/m²     Objective   Physical Exam  Constitutional:       General: She is not in acute distress.     Appearance: She is " well-developed.   Pulmonary:      Effort: Pulmonary effort is normal.   Neurological:      Mental Status: She is alert and oriented to person, place, and time.   Psychiatric:         Behavior: Behavior normal.         Thought Content: Thought content normal.     Records from her specialist independently reviewed by me at today's visit. MRI report also reviewed today.     Assessment & Plan   Diagnoses and all orders for this visit:    1. Positive WINTER (antinuclear antibody) (Primary)  -     Anti-Smith Antibody  -     Anti-DNA Antibody, Double-stranded    2. DDD (degenerative disc disease), thoracic    3. Disturbance of skin sensation    Pt's MRI shows only minimal disease, and don't think this is causing issues.

## 2022-10-19 ENCOUNTER — OFFICE VISIT (OUTPATIENT)
Dept: FAMILY MEDICINE CLINIC | Facility: CLINIC | Age: 47
End: 2022-10-19

## 2022-10-19 VITALS
DIASTOLIC BLOOD PRESSURE: 63 MMHG | RESPIRATION RATE: 16 BRPM | HEART RATE: 82 BPM | WEIGHT: 160 LBS | BODY MASS INDEX: 24.25 KG/M2 | TEMPERATURE: 97.5 F | SYSTOLIC BLOOD PRESSURE: 103 MMHG | HEIGHT: 68 IN

## 2022-10-19 DIAGNOSIS — R20.9 DISTURBANCE OF SKIN SENSATION: ICD-10-CM

## 2022-10-19 DIAGNOSIS — R76.8 POSITIVE ANA (ANTINUCLEAR ANTIBODY): Primary | ICD-10-CM

## 2022-10-19 DIAGNOSIS — M51.34 DDD (DEGENERATIVE DISC DISEASE), THORACIC: ICD-10-CM

## 2022-10-19 PROCEDURE — 99213 OFFICE O/P EST LOW 20 MIN: CPT | Performed by: FAMILY MEDICINE

## 2022-10-19 RX ORDER — PREDNISONE 10 MG/1
TABLET ORAL
COMMUNITY
Start: 2022-10-06 | End: 2023-02-24

## 2022-10-20 LAB
DSDNA AB SER-ACNC: 4 IU/ML (ref 0–9)
ENA SM AB SER-ACNC: <0.2 AI (ref 0–0.9)

## 2022-10-27 ENCOUNTER — TELEPHONE (OUTPATIENT)
Dept: CARDIOLOGY | Facility: CLINIC | Age: 47
End: 2022-10-27

## 2022-10-27 NOTE — TELEPHONE ENCOUNTER
I received a fax from [facility] pharmacy for a refill on Metoprolol Tar 25mg.  I called patient and she verified that this was a one time RX.  I denied the RX at [facility].  Kristin

## 2022-10-31 DIAGNOSIS — R76.8 POSITIVE ANA (ANTINUCLEAR ANTIBODY): Primary | ICD-10-CM

## 2022-11-04 DIAGNOSIS — R20.2 PARESTHESIA: Primary | ICD-10-CM

## 2022-11-29 ENCOUNTER — OFFICE VISIT (OUTPATIENT)
Dept: NEUROLOGY | Facility: CLINIC | Age: 47
End: 2022-11-29

## 2022-11-29 VITALS
BODY MASS INDEX: 25.13 KG/M2 | WEIGHT: 165.8 LBS | HEIGHT: 68 IN | OXYGEN SATURATION: 99 % | DIASTOLIC BLOOD PRESSURE: 78 MMHG | SYSTOLIC BLOOD PRESSURE: 110 MMHG | HEART RATE: 77 BPM

## 2022-11-29 DIAGNOSIS — R20.0 NUMBNESS AND TINGLING OF RIGHT UPPER AND LOWER EXTREMITY: Primary | ICD-10-CM

## 2022-11-29 DIAGNOSIS — R20.2 NUMBNESS AND TINGLING OF RIGHT UPPER AND LOWER EXTREMITY: Primary | ICD-10-CM

## 2022-11-29 PROCEDURE — 99205 OFFICE O/P NEW HI 60 MIN: CPT | Performed by: PSYCHIATRY & NEUROLOGY

## 2022-11-29 NOTE — PROGRESS NOTES
Notes by MS:  Patient referred to our office for numbness of the right arm and right leg. Pt sts this occurs off and on and has been happening for about a year.     Subjective:     Dear Dr. Odonnell, I had the pleasure of seeing Mrs. Yates in consultation today.  As you know, she is a very pleasant 47-year-old woman sent for evaluation of a variety of complaints.  2 or 3 years ago she began feeling numbness and tingling sensations  In the right arm and the right leg without facial involvement, usually when she slept on the right.  If she changed her position the symptoms would improve.  They occasionally happen during the day but most often at night and position usually gradually improves the situation.  In addition, over the past year, she has developed a squeezing or tightness sensation around the circumference of her upper chest that has instituted a thorough work-up which is included an allergy evaluation pulmonary evaluation, cardiac evaluation and GI evaluations all of which were mostly unrevealing as far as a source for these complaints.  She did have an orthopedic evaluation which included C-spine MRI 18 months ago.  This study revealed reversal of lordosis with associated canal stenosis at C3-4, anterolisthesis at C4-5, retrolisthesis and a disc protrusion at C5-6 causing canal stenosis and flattening of the cervical cord and mild C6-7 canal stenosis.  Lumbar spine was unremarkable.  More recent thoracic spine MRI revealed no clear source.  Recent WINTER was mildly positive but the rest of her lupus work-up was negative.  Her B12 level is 431, vitamin D level is 31.  Rheumatoid factor is negative.  She has normal liver and renal function and her last TSH was normal at 2.5.    Patient ID: Opal Yates is a 47 y.o. female.    History of Present Illness  The following portions of the patient's history were reviewed and updated as appropriate: allergies, current medications, past family history, past medical  history, past social history, past surgical history and problem list.    Review of Systems   Constitutional: Negative for activity change, appetite change and fatigue.   HENT: Negative for facial swelling, trouble swallowing and voice change.    Eyes: Negative for photophobia, pain and visual disturbance.   Respiratory: Positive for chest tightness. Negative for shortness of breath and wheezing.    Cardiovascular: Negative for chest pain, palpitations and leg swelling.   Gastrointestinal: Negative for abdominal pain, nausea and vomiting.   Endocrine: Negative for cold intolerance and heat intolerance.   Musculoskeletal: Positive for back pain and neck pain. Negative for arthralgias, gait problem, joint swelling, myalgias and neck stiffness.   Neurological: Positive for numbness (RUE & RLE). Negative for dizziness, tremors, seizures, syncope, facial asymmetry, speech difficulty, weakness, light-headedness and headaches.   Hematological: Does not bruise/bleed easily.   Psychiatric/Behavioral: Negative for agitation, behavioral problems, confusion, decreased concentration, dysphoric mood, hallucinations, self-injury, sleep disturbance and suicidal ideas. The patient is not nervous/anxious and is not hyperactive.         Objective:    Neurologic Exam  Awake alert pleasant cooperative oriented with fluent speech and normal speech comprehension.  Normal social demeanor.    Cranial nerves II through XII normal and symmetric.    Motor exam reveals reasonable tone bulk and power without drift and no lateralizing spasticity.    Sensory exam reveals intact and symmetric sensation to primary modalities.    Tendon reflexes are 2+ at biceps, brachioradialis and triceps on the right, 3+ at biceps and brachioradialis on the left, and 2+ at triceps on the left.  There are 3+ at both patellar tendons and 2+ at the Achilles tendons.  There is no ankle clonus.  Plantar signs are flexor.    Walks in a narrow base with good stride  length.  No spastic findings.  Physical Exam  Neck is reasonably supple.  No cervical bruits.  Chest is clear.  Pulse is regular.  Heart sounds are normal.  Abdomen is soft.  Extremities without significant edema or cyanosis.  Assessment/Plan:     Diagnoses and all orders for this visit:    1. Numbness and tingling of right upper and lower extremity (Primary)  -     MRI Brain With & Without Contrast; Future  -     MRI Cervical Spine With & Without Contrast; Future     47-year-old woman with intermittent, position dependent, numbness and tingling of the right arm and the right leg along with an upper chest squeezing sensation.  She has had thorough GI, cardiac, allergy, and orthopedic work-ups which I reviewed in detail.  Her last MRI of the cervical spine performed 18 months ago, showed fairly significant multilevel degenerative disease most prominent at C5-6 which demonstrated canal narrowing and flattening of the cord (due to processes noted above).  I am arranging for brain and follow-up C-spine MRI both with and without contrast that will follow-up on what is almost certainly a cervical spinal cord source for her complaints, but will also help exclude the unlikely possibility of demyelinating disease.  I discussed all of this with her in great detail and answered her questions today.  She has already been through physical therapy and chiropractic care, and I suggested using at-home cervical traction to see if this helps with her symptomatology as we work her up further.  I will review the results of her imaging and take any further measures that may be necessary.  Otherwise we will see her back in about 3 months time. Thank you very much for the opportunity to participate in the care of this very pleasant and intelligent woman.     65 minutes patient care time today.

## 2022-12-09 ENCOUNTER — PATIENT ROUNDING (BHMG ONLY) (OUTPATIENT)
Dept: NEUROLOGY | Facility: CLINIC | Age: 47
End: 2022-12-09

## 2022-12-23 ENCOUNTER — HOSPITAL ENCOUNTER (OUTPATIENT)
Dept: MRI IMAGING | Facility: HOSPITAL | Age: 47
Discharge: HOME | End: 2022-12-23

## 2022-12-23 ENCOUNTER — APPOINTMENT (OUTPATIENT)
Dept: OTHER | Facility: HOSPITAL | Age: 47
End: 2022-12-23

## 2022-12-23 DIAGNOSIS — R20.0 NUMBNESS AND TINGLING OF RIGHT UPPER AND LOWER EXTREMITY: ICD-10-CM

## 2022-12-23 DIAGNOSIS — R20.2 NUMBNESS AND TINGLING OF RIGHT UPPER AND LOWER EXTREMITY: ICD-10-CM

## 2022-12-23 DIAGNOSIS — Z09 FOLLOW-UP EXAM: ICD-10-CM

## 2022-12-23 PROCEDURE — A9577 INJ MULTIHANCE: HCPCS | Performed by: PSYCHIATRY & NEUROLOGY

## 2022-12-23 PROCEDURE — 72156 MRI NECK SPINE W/O & W/DYE: CPT

## 2022-12-23 PROCEDURE — 70553 MRI BRAIN STEM W/O & W/DYE: CPT

## 2022-12-23 PROCEDURE — 0 GADOBENATE DIMEGLUMINE 529 MG/ML SOLUTION: Performed by: PSYCHIATRY & NEUROLOGY

## 2022-12-23 RX ADMIN — GADOBENATE DIMEGLUMINE 15 ML: 529 INJECTION, SOLUTION INTRAVENOUS at 11:29

## 2023-01-17 ENCOUNTER — OFFICE VISIT (OUTPATIENT)
Dept: OBSTETRICS AND GYNECOLOGY | Facility: CLINIC | Age: 48
End: 2023-01-17
Payer: COMMERCIAL

## 2023-01-17 ENCOUNTER — PROCEDURE VISIT (OUTPATIENT)
Dept: OBSTETRICS AND GYNECOLOGY | Facility: CLINIC | Age: 48
End: 2023-01-17
Payer: COMMERCIAL

## 2023-01-17 VITALS
BODY MASS INDEX: 24.86 KG/M2 | HEIGHT: 68 IN | SYSTOLIC BLOOD PRESSURE: 94 MMHG | DIASTOLIC BLOOD PRESSURE: 66 MMHG | WEIGHT: 164 LBS

## 2023-01-17 DIAGNOSIS — Z12.31 BREAST CANCER SCREENING BY MAMMOGRAM: ICD-10-CM

## 2023-01-17 DIAGNOSIS — Z01.419 WELL WOMAN EXAM WITH ROUTINE GYNECOLOGICAL EXAM: Primary | ICD-10-CM

## 2023-01-17 DIAGNOSIS — N95.1 PERIMENOPAUSAL SYMPTOMS: ICD-10-CM

## 2023-01-17 DIAGNOSIS — Z12.31 VISIT FOR SCREENING MAMMOGRAM: Primary | ICD-10-CM

## 2023-01-17 LAB
ALBUMIN SERPL-MCNC: 4.4 G/DL (ref 3.5–5.2)
ALBUMIN/GLOB SERPL: 2.1 G/DL
ALP SERPL-CCNC: 64 U/L (ref 39–117)
ALT SERPL-CCNC: 13 U/L (ref 1–33)
AST SERPL-CCNC: 18 U/L (ref 1–32)
BILIRUB SERPL-MCNC: 0.4 MG/DL (ref 0–1.2)
BUN SERPL-MCNC: 12 MG/DL (ref 6–20)
BUN/CREAT SERPL: 14.8 (ref 7–25)
CALCIUM SERPL-MCNC: 9.4 MG/DL (ref 8.6–10.5)
CHLORIDE SERPL-SCNC: 104 MMOL/L (ref 98–107)
CO2 SERPL-SCNC: 26.9 MMOL/L (ref 22–29)
CREAT SERPL-MCNC: 0.81 MG/DL (ref 0.57–1)
EGFRCR SERPLBLD CKD-EPI 2021: 89.1 ML/MIN/1.73
ERYTHROCYTE [DISTWIDTH] IN BLOOD BY AUTOMATED COUNT: 12.1 % (ref 12.3–15.4)
GLOBULIN SER CALC-MCNC: 2.1 GM/DL
GLUCOSE SERPL-MCNC: 91 MG/DL (ref 65–99)
HCT VFR BLD AUTO: 42 % (ref 34–46.6)
HGB BLD-MCNC: 13.6 G/DL (ref 12–15.9)
MCH RBC QN AUTO: 29.4 PG (ref 26.6–33)
MCHC RBC AUTO-ENTMCNC: 32.4 G/DL (ref 31.5–35.7)
MCV RBC AUTO: 90.7 FL (ref 79–97)
PLATELET # BLD AUTO: 197 10*3/MM3 (ref 140–450)
POTASSIUM SERPL-SCNC: 4.1 MMOL/L (ref 3.5–5.2)
PROT SERPL-MCNC: 6.5 G/DL (ref 6–8.5)
RBC # BLD AUTO: 4.63 10*6/MM3 (ref 3.77–5.28)
SODIUM SERPL-SCNC: 141 MMOL/L (ref 136–145)
TSH SERPL DL<=0.005 MIU/L-ACNC: 2.49 UIU/ML (ref 0.27–4.2)
WBC # BLD AUTO: 4.2 10*3/MM3 (ref 3.4–10.8)

## 2023-01-17 PROCEDURE — 77063 BREAST TOMOSYNTHESIS BI: CPT | Performed by: STUDENT IN AN ORGANIZED HEALTH CARE EDUCATION/TRAINING PROGRAM

## 2023-01-17 PROCEDURE — 99396 PREV VISIT EST AGE 40-64: CPT | Performed by: STUDENT IN AN ORGANIZED HEALTH CARE EDUCATION/TRAINING PROGRAM

## 2023-01-17 PROCEDURE — 77067 SCR MAMMO BI INCL CAD: CPT | Performed by: STUDENT IN AN ORGANIZED HEALTH CARE EDUCATION/TRAINING PROGRAM

## 2023-01-17 NOTE — PROGRESS NOTES
GYN Annual Exam     CC- Here for annual exam.     Opal Yates is a 47 y.o. female who presents for annual well woman exam. Periods are regular every 26-30 days, lasting 5-6 days. Dysmenorrhea:none. Cyclic symptoms include none. No intermenstrual bleeding, spotting, or discharge. She has had some decrease in attention, hair thinning, few night sweats. She is wondering if she is approaching menopause.     OB History        3    Para   3    Term   3            AB        Living           SAB        IAB        Ectopic        Molar        Multiple   1    Live Births                    Current contraception: tubal ligation  History of abnormal Pap smear: no  Family history of uterine, colon or ovarian cancer: yes - colon cancer- MGF; sister diagnosed at age 47. MGM had female cancer.  History of abnormal mammogram: 2019- BIRADS 0--> diagnostic imaging revealed complicated cyst of the left breast in 2020 and she underwent cyst aspiration that returned benign.  Family history of breast cancer: yes - Mother diagnosed in late 50s ; maternal aunt. Two paternal aunts   Last Pap : 22- NILM, negative HPV   Last mammogram: 22- BIRADS-1  Last colonoscopy:     Past Medical History:   Diagnosis Date   • Allergies    • Calculus of gallbladder without cholecystitis without obstruction 2017   • Gastroesophageal reflux disease 2021   • Hernia     CT scan noted a hiatal hernia.   • Irritable bowel disease    • Irritable bowel syndrome with constipation 2021   • Numbness and tingling    • Syncope        Past Surgical History:   Procedure Laterality Date   •  SECTION     • CHOLECYSTECTOMY     • COLONOSCOPY N/A 2017    Procedure: COLONOSCOPY;  Surgeon: Dimitry Arellano MD;  Location: [facility] ENDOSCOPY;  Service:    • COLONOSCOPY N/A 2022    Procedure: COLONOSCOPY into cecum and terminal ileum;  Surgeon: Latrell Angulo MD;  Location: [facility] ENDOSCOPY;   Service: Gastroenterology;  Laterality: N/A;  Pre op: Family history of colon cancer  Post op: Hemorrhoids   • ENDOSCOPY N/A 1/21/2022    Procedure: ESOPHAGOGASTRODUODENOSCOPY with biopsies;  Surgeon: Latrell Angulo MD;  Location: [facility] ENDOSCOPY;  Service: Gastroenterology;  Laterality: N/A;  Pre op: GERD  Post op: Gastritis   • MAMMO BILATERAL  11/2016    dr pfeiffer   • PAP SMEAR  11/2016    dr pfeiffer   • TUBAL ABDOMINAL LIGATION  2007         Current Outpatient Medications:   •  lansoprazole (PREVACID) 30 MG capsule, Take 1 capsule by mouth Daily., Disp: 90 capsule, Rfl: 3  •  vitamin D3 125 MCG (5000 UT) capsule capsule, , Disp: , Rfl:   •  Cetirizine HCl 10 MG capsule, Take  by mouth., Disp: , Rfl:   •  metoprolol succinate XL (TOPROL-XL) 25 MG 24 hr tablet, Take 1 tablet by mouth Daily., Disp: 2 tablet, Rfl: 0  •  naproxen (Naprosyn) 500 MG tablet, Take 1 tablet by mouth 2 (Two) Times a Day With Meals., Disp: 30 tablet, Rfl: 1  •  predniSONE (DELTASONE) 10 MG tablet, , Disp: , Rfl:     Allergies   Allergen Reactions   • Penicillins Rash       Social History     Tobacco Use   • Smoking status: Never   • Smokeless tobacco: Never   • Tobacco comments:     occas  soda   Vaping Use   • Vaping Use: Never used   Substance Use Topics   • Alcohol use: Yes     Alcohol/week: 0.0 standard drinks     Comment: Very rarely. Maybe once every few months.   • Drug use: No       Family History   Problem Relation Age of Onset   • Stroke Mother    • Breast cancer Mother    • Heart disease Mother    • Arthritis Mother    • Obesity Mother    • Arthritis Father    • Heart disease Father    • Stroke Father    • Hypertension Father    • Hyperlipidemia Father    • Thyroid disease Father    • Colon cancer Sister         She was 47 when diagnosed.   • Colon polyps Sister    • Irritable bowel syndrome Sister    • Breast cancer Maternal Aunt    • Heart disease Maternal Uncle    • Heart disease Paternal Uncle    • Cervical cancer  "Maternal Grandmother    • Colon cancer Maternal Grandfather         Had a colostomy   • Stroke Paternal Grandfather    • Malig Hyperthermia Neg Hx        Review of Systems   All other systems reviewed and are negative.      BP 94/66   Ht 172.7 cm (67.99\")   Wt 74.4 kg (164 lb)   LMP 01/13/2023   BMI 24.94 kg/m²     Physical Exam  Vitals reviewed. Exam conducted with a chaperone present.   Constitutional:       General: She is not in acute distress.  HENT:      Head: Normocephalic and atraumatic.      Right Ear: External ear normal.      Left Ear: External ear normal.   Eyes:      Extraocular Movements: Extraocular movements intact.      Pupils: Pupils are equal, round, and reactive to light.   Cardiovascular:      Rate and Rhythm: Normal rate.   Pulmonary:      Effort: Pulmonary effort is normal. No respiratory distress.   Chest:   Breasts:     Breasts are symmetrical.      Right: No swelling, bleeding, inverted nipple, mass, nipple discharge, skin change or tenderness.      Left: No swelling, bleeding, inverted nipple, mass, nipple discharge, skin change or tenderness.   Abdominal:      General: There is no distension.      Palpations: Abdomen is soft.      Tenderness: There is no abdominal tenderness. There is no guarding or rebound.   Genitourinary:     General: Normal vulva.      Exam position: Lithotomy position.      Labia:         Right: No rash, tenderness, lesion or injury.         Left: No rash, tenderness, lesion or injury.       Urethra: No prolapse or urethral swelling.      Vagina: No vaginal discharge, erythema, tenderness, bleeding or lesions.      Cervix: Normal.      Uterus: Not enlarged, not fixed and not tender.       Adnexa:         Right: No mass, tenderness or fullness.          Left: No mass, tenderness or fullness.     Musculoskeletal:         General: No deformity. Normal range of motion.      Cervical back: Normal range of motion and neck supple.   Lymphadenopathy:      Upper Body:     "  Right upper body: No supraclavicular or axillary adenopathy.      Left upper body: No supraclavicular or axillary adenopathy.      Lower Body: No right inguinal adenopathy. No left inguinal adenopathy.   Skin:     General: Skin is warm and dry.   Neurological:      General: No focal deficit present.      Mental Status: She is alert and oriented to person, place, and time.   Psychiatric:         Mood and Affect: Mood normal.         Behavior: Behavior normal.       Assessment     1) GYN annual well woman exam.   2) Breast cancer screening   3) Perimenopausal symptoms      Plan     1) Breast Health - Clinical breast exam & mammogram yearly, Self breast awareness monthly. Mammogram performed for breast cancer screening.   2) Pap - Up to date.   3) Smoking status- non-smoker   4) Colon health - screening colonoscopy recommended if not up to date  5) Bone health - Weight bearing exercise, dietary calcium recommendations and vitamin D reviewed.   6) Activity recommends - Adult 150-300 min/week of multi-component physical activities that include balance training, aerobic and physical strengthening.    8) Perimenopausal symptoms - Will obtain FSH and estradiol level to evaluate if the patient is approaching menopause. I discussed that if these return normal, it does not mean that she is not approaching menopause and she is likely perimenopausal.   9) Ordered CBC, CMP, TSH.   10) Follow up prn and one year      Zehra Rice MD

## 2023-01-18 LAB
ESTRADIOL SERPL-MCNC: 37.6 PG/ML
FSH SERPL-ACNC: 18.1 MIU/ML

## 2023-01-20 DIAGNOSIS — M53.9 MULTILEVEL DEGENERATIVE DISC DISEASE: Primary | ICD-10-CM
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
## 2023-02-24 ENCOUNTER — OFFICE VISIT (OUTPATIENT)
Dept: NEUROSURGERY | Facility: CLINIC | Age: 48
End: 2023-02-24
Payer: COMMERCIAL

## 2023-02-24 VITALS
BODY MASS INDEX: 25.4 KG/M2 | HEART RATE: 77 BPM | OXYGEN SATURATION: 99 % | SYSTOLIC BLOOD PRESSURE: 118 MMHG | WEIGHT: 167 LBS | DIASTOLIC BLOOD PRESSURE: 62 MMHG

## 2023-02-24 DIAGNOSIS — M54.12 CERVICAL RADICULOPATHY: ICD-10-CM

## 2023-02-24 DIAGNOSIS — M43.8X2 SWAN NECK DEFORMITY OF CERVICAL SPINE: Primary | ICD-10-CM

## 2023-02-24 DIAGNOSIS — M54.16 LUMBAR RADICULOPATHY: ICD-10-CM

## 2023-02-24 PROCEDURE — 99204 OFFICE O/P NEW MOD 45 MIN: CPT | Performed by: NEUROLOGICAL SURGERY

## 2023-02-28 ENCOUNTER — OFFICE VISIT (OUTPATIENT)
Dept: NEUROLOGY | Facility: CLINIC | Age: 48
End: 2023-02-28
Payer: COMMERCIAL

## 2023-02-28 VITALS
DIASTOLIC BLOOD PRESSURE: 74 MMHG | SYSTOLIC BLOOD PRESSURE: 100 MMHG | HEART RATE: 84 BPM | HEIGHT: 68 IN | BODY MASS INDEX: 25.46 KG/M2 | WEIGHT: 168 LBS | OXYGEN SATURATION: 98 %

## 2023-02-28 DIAGNOSIS — M54.12 CERVICAL RADICULOPATHY: Primary | ICD-10-CM

## 2023-02-28 DIAGNOSIS — M43.8X2 SWAN NECK DEFORMITY OF CERVICAL SPINE: ICD-10-CM

## 2023-02-28 PROCEDURE — 99214 OFFICE O/P EST MOD 30 MIN: CPT | Performed by: PSYCHIATRY & NEUROLOGY

## 2023-02-28 NOTE — PROGRESS NOTES
Notes by MA:  Patient presents today for follow up of right upper and lower extremity numbness. Patients sts symptoms are about the same as last office visit.        Subjective:     Patient ID: Opal Yates is a 47 y.o. female.    History of Present Illness  The following portions of the patient's history were reviewed and updated as appropriate: allergies, current medications, past family history, past medical history, past social history, past surgical history and problem list.    Review of Systems   Musculoskeletal: Negative for gait problem.   Neurological: Negative for dizziness, tremors, seizures, syncope, facial asymmetry, speech difficulty, weakness, light-headedness, numbness and headaches.   Psychiatric/Behavioral: Negative for agitation, behavioral problems, confusion, decreased concentration, dysphoric mood, hallucinations, self-injury, sleep disturbance and suicidal ideas. The patient is not nervous/anxious and is not hyperactive.         Objective:    Neurologic Exam  Awake alert pleasant cooperative oriented with fluent speech and normal speech comprehension.  Normal social demeanor.     Cranial nerves II through XII normal and symmetric.     Motor exam reveals reasonable tone bulk and power without drift and no lateralizing spasticity.     Sensory exam reveals intact and symmetric sensation to primary modalities.     Tendon reflexes are 2+ at biceps, brachioradialis and triceps on the right, 3+ at biceps and brachioradialis on the left, and 2+ at triceps on the left.  There are 3+ at both patellar tendons and 2+ at the Achilles tendons.  There is no ankle clonus.  Plantar signs are flexor.     Walks in a narrow base with good stride length.  No spastic findings.  Physical Exam    Assessment/Plan:     Diagnoses and all orders for this visit:    1. Cervical radiculopathy (Primary)    2. [place] neck deformity of cervical spine       Reassuringly, brain and C-spine MRIs did not reveal any cord or brain  signal abnormalities.  No evidence for demyelinating disease.  I personally reviewed her brain images with her in detail and answered her questions.  She did see Dr. Carrasco in neurosurgery and appropriate physical therapy and epidural injections have been arranged.  She continues to use home traction.  I discussed all of this and answered her questions today.  No further neurological invention is warranted currently and we will be happy to see her at any time.

## 2023-04-04 NOTE — PROGRESS NOTES
Patient ID: Opal Yates is a 47 y.o. female is an established patient with 6 week follow up after Pt and Epidural     Today, Opal Yates reports constant ache of neck with numbness and tingling down bilateral arm occasionally. She reports headaches and neck stiffness. She reported no one called her from pain management. She reports current in physical therapy 4 weeks and evaluation is today and doing 2 more weeks. She reports PT has relieved some numbness. She reports use of heat as needed.    Subjective:     History of Present Illness  Opal was not able to have her epidural steroid injection due to scheduling issues but she has had some benefit from performing physical therapy exercises for her neck.  She reports decreased right scapular pain and radiculopathy and improvement in her neck pain as well.  She has been performing physical therapy for 4 weeks and has an additional 2 weeks left.      Review of Systems   HENT: Negative for trouble swallowing.    Respiratory: Positive for chest tightness. Negative for shortness of breath.    Gastrointestinal: Negative for constipation, diarrhea, nausea and vomiting.   Genitourinary: Negative for difficulty urinating and enuresis.   Musculoskeletal: Positive for neck pain and neck stiffness. Negative for gait problem.   Neurological: Positive for numbness. Negative for weakness.   Psychiatric/Behavioral: Positive for sleep disturbance.        While in the room and during my examination of the patient I wore a mask and eye protection.  I washed my hands before and after this patient encounter.  The patient was also wearing a mask.    The following portions of the patient's history were reviewed and updated as appropriate: allergies, current medications, past family history, past medical history, past social history, past surgical history and problem list.     Objective:    Vitals:    04/06/23 0937   BP: 118/68   Pulse: 84   SpO2: 99%     There is no height or weight on  file to calculate BMI.    Physical Exam  Constitutional:       Appearance: Normal appearance.   HENT:      Head: Normocephalic and atraumatic.   Eyes:      Extraocular Movements: Extraocular movements intact.      Conjunctiva/sclera: Conjunctivae normal.      Pupils: Pupils are equal, round, and reactive to light.   Cardiovascular:      Rate and Rhythm: Normal rate and regular rhythm.      Pulses: Normal pulses.   Pulmonary:      Breath sounds: Normal breath sounds.   Abdominal:      Palpations: Abdomen is soft.   Musculoskeletal:         General: Normal range of motion.      Cervical back: Normal range of motion and neck supple.   Skin:     General: Skin is warm and dry.   Neurological:      Mental Status: She is alert and oriented to person, place, and time.      Cranial Nerves: Cranial nerves 2-12 are intact.      Motor: Motor function is intact. No weakness or atrophy.      Coordination: Coordination is intact. Romberg sign negative. Romberg Test normal.      Gait: Gait is intact. Gait normal.      Deep Tendon Reflexes: Reflexes are normal and symmetric.      Reflex Scores:       Tricep reflexes are 2+ on the right side and 2+ on the left side.       Bicep reflexes are 2+ on the right side and 2+ on the left side.       Brachioradialis reflexes are 2+ on the right side and 2+ on the left side.       Patellar reflexes are 2+ on the right side and 2+ on the left side.       Achilles reflexes are 2+ on the right side and 2+ on the left side.  Psychiatric:         Speech: Speech normal.       Neurologic Exam     Mental Status   Oriented to person, place, and time.   Attention: normal. Concentration: normal.   Speech: speech is normal   Level of consciousness: alert    Cranial Nerves   Cranial nerves II through XII intact.     CN III, IV, VI   Pupils are equal, round, and reactive to light.    Motor Exam   Muscle bulk: normal  Overall muscle tone: normal    Strength   Strength 5/5 except as noted.     Sensory Exam    Light touch normal.     Gait, Coordination, and Reflexes     Gait  Gait: normal    Coordination   Romberg: negative    Reflexes   Reflexes 2+ except as noted.   Right brachioradialis: 2+  Left brachioradialis: 2+  Right biceps: 2+  Left biceps: 2+  Right triceps: 2+  Left triceps: 2+  Right patellar: 2+  Left patellar: 2+  Right achilles: 2+  Left achilles: 2+       Results: No new neuroimaging    Assessment/Plan: Plan to retry cervical epidural steroid injection and follow-up in 3 months.  I think that Opal is likely to benefit from continued conservative measures at this time, once that fails she will need a multilevel ACDF to correct her cervical deformity.       Diagnoses and all orders for this visit:    1. [place] neck deformity of cervical spine (Primary)  -     Epidural Block    2. Cervical radiculopathy  -     Epidural Block                Giuliano Carrasco MD  04/06/23  10:34 EDT

## 2023-04-06 ENCOUNTER — OFFICE VISIT (OUTPATIENT)
Dept: NEUROSURGERY | Facility: CLINIC | Age: 48
End: 2023-04-06
Payer: COMMERCIAL

## 2023-04-06 VITALS — HEART RATE: 84 BPM | DIASTOLIC BLOOD PRESSURE: 68 MMHG | SYSTOLIC BLOOD PRESSURE: 118 MMHG | OXYGEN SATURATION: 99 %

## 2023-04-06 DIAGNOSIS — M54.12 CERVICAL RADICULOPATHY: ICD-10-CM

## 2023-04-06 DIAGNOSIS — M43.8X2 SWAN NECK DEFORMITY OF CERVICAL SPINE: Primary | ICD-10-CM

## 2023-04-06 PROCEDURE — 99213 OFFICE O/P EST LOW 20 MIN: CPT | Performed by: NEUROLOGICAL SURGERY

## 2023-05-31 ENCOUNTER — TELEPHONE (OUTPATIENT)
Dept: FAMILY MEDICINE CLINIC | Facility: CLINIC | Age: 48
End: 2023-05-31

## 2023-05-31 DIAGNOSIS — Z00.00 ANNUAL PHYSICAL EXAM: Primary | ICD-10-CM

## 2023-05-31 DIAGNOSIS — E55.9 VITAMIN D DEFICIENCY: ICD-10-CM

## 2023-05-31 NOTE — TELEPHONE ENCOUNTER
Caller: Opal Yates    Relationship: Self    Best call back number: [phone]    What orders are you requesting (i.e. lab or imaging): BLOOD WORK, VITAMIN D AND CHOLESTROL    In what timeframe would the patient need to come in: BEFORE 06/08/23    Where will you receive your lab/imaging services: IN OFFICE    Additional notes: PLEASE ADVISE

## 2023-06-02 LAB
25(OH)D3+25(OH)D2 SERPL-MCNC: 53.4 NG/ML (ref 30–100)
ALBUMIN SERPL-MCNC: 4.5 G/DL (ref 3.5–5.2)
ALBUMIN/GLOB SERPL: 1.8 G/DL
ALP SERPL-CCNC: 69 U/L (ref 39–117)
ALT SERPL-CCNC: 16 U/L (ref 1–33)
AST SERPL-CCNC: 14 U/L (ref 1–32)
BASOPHILS # BLD AUTO: 0.02 10*3/MM3 (ref 0–0.2)
BASOPHILS NFR BLD AUTO: 0.4 % (ref 0–1.5)
BILIRUB SERPL-MCNC: 0.4 MG/DL (ref 0–1.2)
BUN SERPL-MCNC: 13 MG/DL (ref 6–20)
BUN/CREAT SERPL: 14.6 (ref 7–25)
CALCIUM SERPL-MCNC: 10 MG/DL (ref 8.6–10.5)
CHLORIDE SERPL-SCNC: 105 MMOL/L (ref 98–107)
CHOLEST SERPL-MCNC: 188 MG/DL (ref 0–200)
CO2 SERPL-SCNC: 30.3 MMOL/L (ref 22–29)
CREAT SERPL-MCNC: 0.89 MG/DL (ref 0.57–1)
EGFRCR SERPLBLD CKD-EPI 2021: 79.6 ML/MIN/1.73
EOSINOPHIL # BLD AUTO: 0.11 10*3/MM3 (ref 0–0.4)
EOSINOPHIL NFR BLD AUTO: 2.3 % (ref 0.3–6.2)
ERYTHROCYTE [DISTWIDTH] IN BLOOD BY AUTOMATED COUNT: 11.8 % (ref 12.3–15.4)
GLOBULIN SER CALC-MCNC: 2.5 GM/DL
GLUCOSE SERPL-MCNC: 92 MG/DL (ref 65–99)
HCT VFR BLD AUTO: 42.8 % (ref 34–46.6)
HDLC SERPL-MCNC: 35 MG/DL (ref 40–60)
HGB BLD-MCNC: 14.5 G/DL (ref 12–15.9)
IMM GRANULOCYTES # BLD AUTO: 0 10*3/MM3 (ref 0–0.05)
IMM GRANULOCYTES NFR BLD AUTO: 0 % (ref 0–0.5)
LDLC SERPL CALC-MCNC: 108 MG/DL (ref 0–100)
LYMPHOCYTES # BLD AUTO: 1.96 10*3/MM3 (ref 0.7–3.1)
LYMPHOCYTES NFR BLD AUTO: 40.8 % (ref 19.6–45.3)
MCH RBC QN AUTO: 30.4 PG (ref 26.6–33)
MCHC RBC AUTO-ENTMCNC: 33.9 G/DL (ref 31.5–35.7)
MCV RBC AUTO: 89.7 FL (ref 79–97)
MONOCYTES # BLD AUTO: 0.48 10*3/MM3 (ref 0.1–0.9)
MONOCYTES NFR BLD AUTO: 10 % (ref 5–12)
NEUTROPHILS # BLD AUTO: 2.23 10*3/MM3 (ref 1.7–7)
NEUTROPHILS NFR BLD AUTO: 46.5 % (ref 42.7–76)
NRBC BLD AUTO-RTO: 0 /100 WBC (ref 0–0.2)
PLATELET # BLD AUTO: 200 10*3/MM3 (ref 140–450)
POTASSIUM SERPL-SCNC: 4.5 MMOL/L (ref 3.5–5.2)
PROT SERPL-MCNC: 7 G/DL (ref 6–8.5)
RBC # BLD AUTO: 4.77 10*6/MM3 (ref 3.77–5.28)
SODIUM SERPL-SCNC: 144 MMOL/L (ref 136–145)
TRIGL SERPL-MCNC: 259 MG/DL (ref 0–150)
TSH SERPL DL<=0.005 MIU/L-ACNC: 2.94 UIU/ML (ref 0.27–4.2)
VLDLC SERPL CALC-MCNC: 45 MG/DL (ref 5–40)
WBC # BLD AUTO: 4.8 10*3/MM3 (ref 3.4–10.8)

## 2023-06-08 ENCOUNTER — OFFICE VISIT (OUTPATIENT)
Dept: FAMILY MEDICINE CLINIC | Facility: CLINIC | Age: 48
End: 2023-06-08
Payer: COMMERCIAL

## 2023-06-08 VITALS
SYSTOLIC BLOOD PRESSURE: 97 MMHG | TEMPERATURE: 97.8 F | BODY MASS INDEX: 25.16 KG/M2 | DIASTOLIC BLOOD PRESSURE: 59 MMHG | RESPIRATION RATE: 16 BRPM | HEART RATE: 78 BPM | WEIGHT: 166 LBS | HEIGHT: 68 IN

## 2023-06-08 DIAGNOSIS — E78.5 DYSLIPIDEMIA: ICD-10-CM

## 2023-06-08 DIAGNOSIS — Z00.00 ANNUAL PHYSICAL EXAM: Primary | ICD-10-CM

## 2023-06-08 PROCEDURE — 99396 PREV VISIT EST AGE 40-64: CPT | Performed by: FAMILY MEDICINE

## 2023-06-08 NOTE — PROGRESS NOTES
"Subjective   Opal Yates is a 47 y.o. female.     CC: Annual Exam    History of Present Illness     Opal Yates 47 y.o. female who presents for an Annual Wellness Visit.  she has a history of   Patient Active Problem List   Diagnosis    Calculus of gallbladder without cholecystitis without obstruction    Family history of breast cancer in mother    Family history of colon cancer    Irritable bowel syndrome with diarrhea    Gastroesophageal reflux disease    Encounter for screening for malignant neoplasm of colon    [place] neck deformity of cervical spine    Cervical radiculopathy    Lumbar radiculopathy   .  she has been feeling well.   I  reviewed health maintenance with her as part of my preventative care plan.    The following portions of the patient's history were reviewed and updated as appropriate: allergies, current medications, past family history, past medical history, past social history, past surgical history, and problem list.    Review of Systems   Constitutional:  Negative for appetite change, fever and unexpected weight change.   HENT:  Negative for ear pain, facial swelling and sore throat.    Eyes:  Negative for pain and visual disturbance.   Respiratory:  Negative for chest tightness, shortness of breath and wheezing.    Cardiovascular:  Negative for chest pain and palpitations.   Gastrointestinal:  Negative for abdominal pain and blood in stool.   Endocrine: Negative.    Genitourinary:  Negative for difficulty urinating and hematuria.   Musculoskeletal:  Negative for joint swelling.   Neurological:  Negative for tremors, seizures and syncope.   Hematological:  Negative for adenopathy.   Psychiatric/Behavioral: Negative.       BP 97/59   Pulse 78   Temp 97.8 °F (36.6 °C) (Oral)   Resp 16   Ht 172.7 cm (67.99\")   Wt 75.3 kg (166 lb)   BMI 25.25 kg/m²     Objective   Physical Exam  Vitals and nursing note reviewed. Exam conducted with a chaperone present.   Constitutional:       Appearance: " She is well-developed.   HENT:      Head: Normocephalic and atraumatic.      Right Ear: External ear normal.      Left Ear: External ear normal.      Mouth/Throat:      Pharynx: No oropharyngeal exudate.   Eyes:      General: No scleral icterus.     Conjunctiva/sclera: Conjunctivae normal.      Pupils: Pupils are equal, round, and reactive to light.   Neck:      Thyroid: No thyromegaly.   Cardiovascular:      Rate and Rhythm: Normal rate and regular rhythm.      Heart sounds: No murmur heard.    No gallop.   Pulmonary:      Effort: Pulmonary effort is normal.      Breath sounds: Normal breath sounds. No wheezing or rales.   Abdominal:      General: Bowel sounds are normal. There is no distension.      Palpations: Abdomen is soft. There is no mass.      Tenderness: There is no abdominal tenderness.      Hernia: No hernia is present.   Musculoskeletal:         General: No tenderness or deformity. Normal range of motion.      Cervical back: Normal range of motion and neck supple.   Lymphadenopathy:      Cervical: No cervical adenopathy.   Skin:     General: Skin is warm and dry.      Findings: No rash.   Neurological:      Mental Status: She is alert and oriented to person, place, and time.      Deep Tendon Reflexes: Reflexes are normal and symmetric.   Psychiatric:         Behavior: Behavior normal.   Labs reviewed with pt today during visit. All questions answered.  Maye present for exam.    Assessment & Plan   Diagnoses and all orders for this visit:    1. Annual physical exam (Primary)    2. Dyslipidemia  -     Lipid Panel; Future    Healthy diet/exercise/weight management discussed with pt.

## 2023-08-10 ENCOUNTER — TELEPHONE (OUTPATIENT)
Dept: GASTROENTEROLOGY | Facility: CLINIC | Age: 48
End: 2023-08-10
Payer: COMMERCIAL

## 2023-08-10 NOTE — TELEPHONE ENCOUNTER
HUB STAFF ATTEMPTED TO FOLLOW WARM TRANSFER PROCESS BUT WAS UNSUCCESSFUL.     Caller: Opal Yates    Relationship to patient: Self    Best call back number: [phone]    Chief complaint: SOONER APPT.    Type of visit: OFFICE VISIT      Additional notes: PATIENT IS ESTABLISHED WITH DR. DONOVAN WHO DOES NOT HAVE ANY AVAILABILITY UNTIL DECEMBER, PATIENT DOES NOT FEEL SHE CAN WAIT THIS LONG WITH CONCERNS FOR HER HEALTH. DEQUAN SHERIDAN HAD SOME OPENINGS LATER TODAY(08/10/23) @ 2:15PM AND 3:45PM AND ALSO AN OPENING TOMORROW(08/11/23) @ 1:45PM. I AM UNABLE TO BOOK SAME DAY OR NEXT DAY APPTS. PATIENT IS EXPERIENCING CHRONIC DIARRHEA FOR THE LAST SEVERAL WEEKS W/ LOTS OF GAS, BUT NO PAIN. SHE STATES THIS IS SIMILAR TO BEFORE AND LAST TIME WAS GIVEN AN ANTIBIOTIC WHICH HELPED.

## 2023-08-11 ENCOUNTER — OFFICE VISIT (OUTPATIENT)
Dept: GASTROENTEROLOGY | Facility: CLINIC | Age: 48
End: 2023-08-11
Payer: COMMERCIAL

## 2023-08-11 ENCOUNTER — DOCUMENTATION (OUTPATIENT)
Dept: GASTROENTEROLOGY | Facility: CLINIC | Age: 48
End: 2023-08-11
Payer: COMMERCIAL

## 2023-08-11 VITALS
WEIGHT: 160.2 LBS | HEIGHT: 68 IN | OXYGEN SATURATION: 98 % | DIASTOLIC BLOOD PRESSURE: 62 MMHG | BODY MASS INDEX: 24.28 KG/M2 | HEART RATE: 78 BPM | SYSTOLIC BLOOD PRESSURE: 93 MMHG | TEMPERATURE: 97.3 F

## 2023-08-11 DIAGNOSIS — K58.0 IRRITABLE BOWEL SYNDROME WITH DIARRHEA: Primary | ICD-10-CM

## 2023-08-11 PROCEDURE — 99214 OFFICE O/P EST MOD 30 MIN: CPT | Performed by: NURSE PRACTITIONER

## 2023-08-11 RX ORDER — DIPHENHYDRAMINE HYDROCHLORIDE 25 MG/1
TABLET ORAL
COMMUNITY
Start: 2023-06-01

## 2023-08-11 RX ORDER — THIAMINE HCL 100 MG
TABLET ORAL
COMMUNITY
Start: 2023-06-01

## 2023-08-11 NOTE — PROGRESS NOTES
Chief Complaint   Patient presents with    Diarrhea       HPI    Opal Yates is a  47 y.o. female here for a follow up visit for diarrhea.    This patient follows with Dr. Angulo, new to me.    Patient reports approximately 5 weeks of increased diarrhea symptoms in between she is passing formed stools with feelings of lower abdominal discomfort without overt abdominal pain.  Symptoms similar to her baseline irritable bowel syndrome seems to have been sparked after traveling with her family out of town.  She has been working on weight reduction with intermittent fasting.  She is also been on magnesium to with calcium absorption, per patient.  No rectal bleeding or rectal pain.    Additional data reviewed:    EGD  w/ Erythematous mucosa in the antrum otherwise normal. Path + chronic gastritis.  C-scope  w/ Nonbleeding internal hemorrhoids otherwise normal.  Recall 5 years.    Past Medical History:   Diagnosis Date    Allergic 1993    Seasonal & Penicillan    Allergies     Arthritis 2019    Calculus of gallbladder without cholecystitis without obstruction 2017    Cervical radiculopathy 2023    Depression 2007    Postpartum    Gastroesophageal reflux disease 2021    Hernia     CT scan noted a hiatal hernia.    Irritable bowel disease     Irritable bowel syndrome with constipation 2021    Low back pain 2019    Arthritis    Numbness and tingling     Pneumonia 2008    Syncope        Past Surgical History:   Procedure Laterality Date     SECTION  2007    CHOLECYSTECTOMY      COLONOSCOPY N/A 2017    Procedure: COLONOSCOPY;  Surgeon: Dimitry Arellano MD;  Location: [facility] ENDOSCOPY;  Service:     COLONOSCOPY N/A 2022    Procedure: COLONOSCOPY into cecum and terminal ileum;  Surgeon: Latrell Angulo MD;  Location: [facility] ENDOSCOPY;  Service: Gastroenterology;  Laterality: N/A;  Pre op: Family history of colon cancer  Post op: Hemorrhoids    COLONOSCOPY  2022     ENDOSCOPY N/A 01/21/2022    Procedure: ESOPHAGOGASTRODUODENOSCOPY with biopsies;  Surgeon: Latrell Angulo MD;  Location: [facility] ENDOSCOPY;  Service: Gastroenterology;  Laterality: N/A;  Pre op: GERD  Post op: Gastritis    MAMMO BILATERAL  11/2016    dr pfeiffer    PAP SMEAR  11/2016    dr pfeiffer    TUBAL ABDOMINAL LIGATION  2007       Scheduled Meds:     Continuous Infusions: No current facility-administered medications for this visit.      PRN Meds:     Allergies   Allergen Reactions    Penicillins Rash       Social History     Socioeconomic History    Marital status:    Tobacco Use    Smoking status: Never    Smokeless tobacco: Never    Tobacco comments:     occas  soda   Vaping Use    Vaping Use: Never used   Substance and Sexual Activity    Alcohol use: Yes     Comment: Very rarely. Maybe once every few months.    Drug use: No    Sexual activity: Yes     Partners: Male     Birth control/protection: Tubal ligation     Comment: tubal       Family History   Problem Relation Age of Onset    Stroke Mother     Breast cancer Mother     Heart disease Mother         Congestive Heart Failure    Arthritis Mother     Obesity Mother     Asthma Mother     Cancer Mother         Breast    Arthritis Father     Heart disease Father         Bypass    Stroke Father     Hypertension Father     Hyperlipidemia Father     Thyroid disease Father         hypothyroid    Asthma Father     Cancer Father         Skin    Depression Father     Hearing loss Father     Colon cancer Sister         She was 47 when diagnosed.    Colon polyps Sister     Irritable bowel syndrome Sister     Cancer Sister         Colon    Breast cancer Maternal Aunt     Heart disease Maternal Uncle     Heart disease Paternal Uncle     Cervical cancer Maternal Grandmother     Colon cancer Maternal Grandfather         Had a colostomy    Stroke Paternal Grandfather     Anxiety disorder Daughter     Asthma Daughter     Depression Daughter     Thyroid  disease Daughter         hypothyroid    Developmental Disability Son         Autism    Malig Hyperthermia Neg Hx        Review of Systems   Constitutional:  Negative for appetite change and unexpected weight change.   HENT:  Negative for trouble swallowing.    Gastrointestinal:  Positive for diarrhea.     Vitals:    08/11/23 1354   BP: 93/62   Pulse: 78   Temp: 97.3 øF (36.3 øC)   SpO2: 98%       Physical Exam  Constitutional:       Appearance: She is well-developed.   Abdominal:      General: Bowel sounds are normal. There is no distension.      Palpations: Abdomen is soft. There is no mass.      Tenderness: There is no abdominal tenderness. There is no guarding.      Hernia: No hernia is present.   Skin:     General: Skin is warm and dry.      Capillary Refill: Capillary refill takes less than 2 seconds.   Neurological:      Mental Status: She is alert and oriented to person, place, and time.   Psychiatric:         Behavior: Behavior normal.     Assessment    Diagnoses and all orders for this visit:    1. Irritable bowel syndrome with diarrhea (Primary)    Other orders  -     riFAXIMin (Xifaxan) 550 MG tablet; Take 1 tablet by mouth 3 (Three) Times a Day for 14 days.  Dispense: 42 tablet; Refill: 0    Plan    Recommend 14 days of 3 times daily dosing Xifaxan for IBS-D  Trial of low FODMAP diet educational handout provided  I asked her to speak with Dr. Odonnell regarding her magnesium supplement ?  Could this be contributing to symptoms  RTC if symptoms do not improve with today's recommendation         ERICH Giordano  [facility]  [address] [place] [address]  Office: [phone]

## 2023-08-11 NOTE — PROGRESS NOTES
Specialty Pharmacy     PA started for Xifaxan  Key: [id number]       Felicia Pathak  Specialty Pharmacy Technician

## 2023-08-14 ENCOUNTER — TELEPHONE (OUTPATIENT)
Dept: GASTROENTEROLOGY | Facility: CLINIC | Age: 48
End: 2023-08-14
Payer: COMMERCIAL

## 2023-08-14 NOTE — TELEPHONE ENCOUNTER
PA started on Friday - denied for viberzi or amitriptyline - sent Appeal to insurance and sent mychart message to patient

## 2023-08-14 NOTE — TELEPHONE ENCOUNTER
----- Message from Opal Yates sent at 8/11/2023  4:46 PM EDT -----  Regarding: Prescription Denial  Contact: [phone]  I received a message from my insurance saying that the prescription has been denied and would need to be appealed.

## 2023-08-16 ENCOUNTER — SPECIALTY PHARMACY (OUTPATIENT)
Dept: GASTROENTEROLOGY | Facility: CLINIC | Age: 48
End: 2023-08-16
Payer: COMMERCIAL

## 2023-08-16 NOTE — PROGRESS NOTES
Specialty Pharmacy    PA denied for Xifaxan  Sent Appeal on 8/14/23  Approved through 8/28/23  Tried to call [employer], but could not get through - sent patient message to check on affordability     Felicia Pathak  Specialty Pharmacy Technician

## 2023-09-13 ENCOUNTER — SPECIALTY PHARMACY (OUTPATIENT)
Dept: GASTROENTEROLOGY | Facility: CLINIC | Age: 48
End: 2023-09-13
Payer: COMMERCIAL

## 2023-09-13 NOTE — PROGRESS NOTES
Specialty Pharmacy     Xifaxan PA approved through 9/27/23  Key: [id number]  Request Reference Number: [id number]. XIFAXAN TAB 550MG is approved through 09/27/2023.     Felicia Pathak  Specialty Pharmacy Technician

## 2023-11-14 DIAGNOSIS — E78.5 DYSLIPIDEMIA: ICD-10-CM

## 2023-11-14 DIAGNOSIS — E55.9 VITAMIN D DEFICIENCY: Primary | ICD-10-CM

## 2023-11-20 ENCOUNTER — TELEPHONE (OUTPATIENT)
Dept: FAMILY MEDICINE CLINIC | Facility: CLINIC | Age: 48
End: 2023-11-20
Payer: COMMERCIAL

## 2023-11-20 DIAGNOSIS — R30.0 DYSURIA: Primary | ICD-10-CM

## 2023-11-20 RX ORDER — CHLORZOXAZONE 500 MG/1
500 TABLET ORAL
COMMUNITY
Start: 2023-10-18

## 2023-11-20 NOTE — PROGRESS NOTES
"Subjective   Opal Yates is a 48 y.o. female.     CC: Pain With Urination    History of Present Illness     Patient comes in today reporting pain with urination and urinary frequency for the last 2 days.  Patient did complete a urinalysis and culture request yesterday, but those results are not yet back.  Patient reports slightly better today.  Denies fever/chills/flank pain.       The following portions of the patient's history were reviewed and updated as appropriate: allergies, current medications, past family history, past medical history, past social history, past surgical history, and problem list.    Review of Systems   Constitutional:  Negative for activity change, chills and fever.   Respiratory:  Negative for cough.    Cardiovascular:  Negative for chest pain.   Gastrointestinal:  Negative for nausea and vomiting.   Genitourinary:  Positive for dysuria, frequency and urgency. Negative for flank pain and hematuria.   Psychiatric/Behavioral:  Negative for dysphoric mood.        BP 92/62   Pulse 76   Temp 97.7 °F (36.5 °C) (Oral)   Resp 16   Ht 172.7 cm (67.99\")   Wt 72.1 kg (159 lb)   SpO2 98%   BMI 24.18 kg/m²     Objective   Physical Exam  Constitutional:       General: She is not in acute distress.     Appearance: She is well-developed.   Pulmonary:      Effort: Pulmonary effort is normal.   Neurological:      Mental Status: She is alert and oriented to person, place, and time.   Psychiatric:         Behavior: Behavior normal.         Thought Content: Thought content normal.         Assessment & Plan   Diagnoses and all orders for this visit:    1. Dysuria (Primary)  -     sulfamethoxazole-trimethoprim (Bactrim DS) 800-160 MG per tablet; Take 1 tablet by mouth 2 (Two) Times a Day for 5 days.  Dispense: 10 tablet; Refill: 0    We will go ahead and start patient on antibiotic today due to her symptoms pending results from the urinalysis.  Once that is back, further guidance will be given.         "

## 2023-11-20 NOTE — TELEPHONE ENCOUNTER
Caller: Opal Yates    Relationship: Self    Best call back number: [phone]    What orders are you requesting (i.e. lab or imaging): URINALYSIS    In what timeframe would the patient need to come in: ASAP    Where will you receive your lab/imaging services: IN OFFICE     Additional notes: PATIENT IS COMING IN TODAY FOR BLOOD WORK AND IS REQUESTING THE URINALYSIS ALSO, SHE STATES THAT SHE IS HAVING FREQUENT URINATION AND DISCOMFORT. PLEASE ADVISE.

## 2023-11-20 NOTE — TELEPHONE ENCOUNTER
Patient told / message also sent through my chart -Order placed.  That said, when she goes to the lab, she will need to tell them there is a urine test as well as blood test and both need to be completed.

## 2023-11-21 ENCOUNTER — OFFICE VISIT (OUTPATIENT)
Dept: FAMILY MEDICINE CLINIC | Facility: CLINIC | Age: 48
End: 2023-11-21
Payer: COMMERCIAL

## 2023-11-21 VITALS
OXYGEN SATURATION: 98 % | RESPIRATION RATE: 16 BRPM | HEIGHT: 68 IN | SYSTOLIC BLOOD PRESSURE: 92 MMHG | DIASTOLIC BLOOD PRESSURE: 62 MMHG | TEMPERATURE: 97.7 F | WEIGHT: 159 LBS | HEART RATE: 76 BPM | BODY MASS INDEX: 24.1 KG/M2

## 2023-11-21 DIAGNOSIS — R30.0 DYSURIA: Primary | ICD-10-CM

## 2023-11-21 LAB
25(OH)D3+25(OH)D2 SERPL-MCNC: 42.6 NG/ML (ref 30–100)
APPEARANCE UR: CLEAR
BACTERIA #/AREA URNS HPF: ABNORMAL /[HPF]
BILIRUB UR QL STRIP: NEGATIVE
CASTS URNS QL MICRO: ABNORMAL /LPF
CHOLEST SERPL-MCNC: 194 MG/DL (ref 100–199)
CHOLEST/HDLC SERPL: 3 RATIO (ref 0–4.4)
COLOR UR: YELLOW
CRYSTALS URNS MICRO: ABNORMAL
EPI CELLS #/AREA URNS HPF: ABNORMAL /HPF (ref 0–10)
GLUCOSE UR QL STRIP: NEGATIVE
HDLC SERPL-MCNC: 64 MG/DL
HGB UR QL STRIP: NEGATIVE
KETONES UR QL STRIP: NEGATIVE
LDLC SERPL CALC-MCNC: 118 MG/DL (ref 0–99)
LEUKOCYTE ESTERASE UR QL STRIP: NEGATIVE
MICRO URNS: ABNORMAL
MICRO URNS: ABNORMAL
NITRITE UR QL STRIP: NEGATIVE
PH UR STRIP: 5.5 [PH] (ref 5–7.5)
PROT UR QL STRIP: NEGATIVE
RBC #/AREA URNS HPF: ABNORMAL /HPF (ref 0–2)
SP GR UR STRIP: >=1.03 (ref 1–1.03)
TRIGL SERPL-MCNC: 67 MG/DL (ref 0–149)
UNIDENT CRYS URNS QL MICRO: PRESENT
URINALYSIS REFLEX: ABNORMAL
UROBILINOGEN UR STRIP-MCNC: 0.2 MG/DL (ref 0.2–1)
VLDLC SERPL CALC-MCNC: 12 MG/DL (ref 5–40)
WBC #/AREA URNS HPF: ABNORMAL /HPF (ref 0–5)

## 2023-11-21 PROCEDURE — 99213 OFFICE O/P EST LOW 20 MIN: CPT | Performed by: FAMILY MEDICINE

## 2023-11-21 RX ORDER — SULFAMETHOXAZOLE AND TRIMETHOPRIM 800; 160 MG/1; MG/1
1 TABLET ORAL 2 TIMES DAILY
Qty: 10 TABLET | Refills: 0 | Status: SHIPPED | OUTPATIENT
Start: 2023-11-21 | End: 2023-11-26

## 2024-01-09 ENCOUNTER — TELEPHONE (OUTPATIENT)
Dept: OBSTETRICS AND GYNECOLOGY | Facility: CLINIC | Age: 49
End: 2024-01-09
Payer: COMMERCIAL

## 2024-01-09 DIAGNOSIS — N93.9 ABNORMAL UTERINE BLEEDING (AUB): Primary | ICD-10-CM

## 2024-01-09 NOTE — TELEPHONE ENCOUNTER
Caller: Opal Yates    Relationship: Self    Best call back number: [phone]    What orders are you requesting (i.e. lab or imaging): U/S ORDER     In what timeframe would the patient need to come in: ASAP    Where will you receive your lab/imaging services: [facility]# [phone]    Additional notes: PT HAS AN U/S SCHEDULED WITH US BUT THE COST WOULD BE $900 FOR PT SHE WOULD LIKE TO HAVE IT DONE AT [facility] IT WOULD BE CHEAPER PT WOULD LIKE ORDER FAXED TO THEM

## 2024-01-16 ENCOUNTER — TELEPHONE (OUTPATIENT)
Dept: OBSTETRICS AND GYNECOLOGY | Facility: CLINIC | Age: 49
End: 2024-01-16
Payer: COMMERCIAL

## 2024-01-16 NOTE — TELEPHONE ENCOUNTER
Caller: Opal Yates    Relationship to patient: Self    Best call back number: [phone] (home)        WOULD LIKE TO CANCEL U/S APPT ON 01-@9:20.  WOULD LIKE TO KEEP MAMMO APPT AND ANNUAL EXAM.        UNABLE TO WT CALL

## 2024-01-24 ENCOUNTER — OFFICE VISIT (OUTPATIENT)
Dept: OBSTETRICS AND GYNECOLOGY | Facility: CLINIC | Age: 49
End: 2024-01-24
Payer: COMMERCIAL

## 2024-01-24 ENCOUNTER — PROCEDURE VISIT (OUTPATIENT)
Dept: OBSTETRICS AND GYNECOLOGY | Facility: CLINIC | Age: 49
End: 2024-01-24
Payer: COMMERCIAL

## 2024-01-24 VITALS
SYSTOLIC BLOOD PRESSURE: 100 MMHG | DIASTOLIC BLOOD PRESSURE: 60 MMHG | BODY MASS INDEX: 23.95 KG/M2 | WEIGHT: 158 LBS | HEIGHT: 68 IN

## 2024-01-24 DIAGNOSIS — Z12.31 BREAST CANCER SCREENING BY MAMMOGRAM: ICD-10-CM

## 2024-01-24 DIAGNOSIS — Z01.419 WELL WOMAN EXAM WITH ROUTINE GYNECOLOGICAL EXAM: Primary | ICD-10-CM

## 2024-01-24 DIAGNOSIS — N93.9 ABNORMAL UTERINE BLEEDING (AUB): ICD-10-CM

## 2024-01-24 DIAGNOSIS — Z12.31 VISIT FOR SCREENING MAMMOGRAM: Primary | ICD-10-CM

## 2024-01-24 DIAGNOSIS — Z12.4 CERVICAL CANCER SCREENING: ICD-10-CM

## 2024-01-26 ENCOUNTER — TELEPHONE (OUTPATIENT)
Dept: OBSTETRICS AND GYNECOLOGY | Facility: CLINIC | Age: 49
End: 2024-01-26
Payer: COMMERCIAL

## 2024-01-26 NOTE — TELEPHONE ENCOUNTER
----- Message from Opal Yates sent at 1/26/2024  9:54 AM EST -----  Regarding: Long Period  Contact: [phone]  I did spot for a couple of days after the biopsy but today I seem to be starting a full period. So, I made it 23 days this time. Of course, I am not sure if the biopsy triggered the period or if I was going to start today anyway.     I went back and looked at my tracking from 2021 to see if it had any weird periods but 2021 seemed to be fairly consistent. The updated tracking is attached.    Do you know how long it should take to get the test results back?

## 2024-01-31 LAB
DX ICD CODE: NORMAL
PATH REPORT.FINAL DX SPEC: NORMAL
PATH REPORT.GROSS SPEC: NORMAL
PATH REPORT.SITE OF ORIGIN SPEC: NORMAL
PATHOLOGIST NAME: NORMAL
PAYMENT PROCEDURE: NORMAL

## 2024-02-01 ENCOUNTER — TELEPHONE (OUTPATIENT)
Dept: OBSTETRICS AND GYNECOLOGY | Facility: CLINIC | Age: 49
End: 2024-02-01
Payer: COMMERCIAL

## 2024-02-01 NOTE — TELEPHONE ENCOUNTER
Pt called wanting to know her most recent lab results? Pt aware of pap results    Please advise, thank you

## 2024-02-01 NOTE — TELEPHONE ENCOUNTER
Please call and let her know that her endometrial biopsy returned yesterday and returned showing normal endometrial tissue without concern for abnormal tissue or cancer. Because of this, I think her early bleeding is hormonal as there is no evidence of a structural abnormality. I recommend she continue to monitor at this time. Let me know if she has further questions.     Zehra Rice MD

## 2024-05-21 ENCOUNTER — OFFICE VISIT (OUTPATIENT)
Dept: FAMILY MEDICINE CLINIC | Facility: CLINIC | Age: 49
End: 2024-05-21
Payer: COMMERCIAL

## 2024-05-21 VITALS
DIASTOLIC BLOOD PRESSURE: 74 MMHG | RESPIRATION RATE: 16 BRPM | OXYGEN SATURATION: 98 % | SYSTOLIC BLOOD PRESSURE: 116 MMHG | HEIGHT: 68 IN | TEMPERATURE: 98.1 F | BODY MASS INDEX: 24.1 KG/M2 | HEART RATE: 77 BPM | WEIGHT: 159 LBS

## 2024-05-21 DIAGNOSIS — R91.1 PULMONARY NODULE: ICD-10-CM

## 2024-05-21 DIAGNOSIS — R93.89 ABNORMAL CHEST CT: Primary | ICD-10-CM

## 2024-05-21 PROCEDURE — 99213 OFFICE O/P EST LOW 20 MIN: CPT | Performed by: FAMILY MEDICINE

## 2024-05-21 RX ORDER — ACETAMINOPHEN 500 MG
TABLET ORAL EVERY 6 HOURS PRN
COMMUNITY

## 2024-05-21 RX ORDER — DIPHENOXYLATE HYDROCHLORIDE AND ATROPINE SULFATE 2.5; .025 MG/1; MG/1
1 TABLET ORAL DAILY
COMMUNITY

## 2024-05-21 NOTE — PROGRESS NOTES
"Subjective   Opal Yates is a 48 y.o. female.     CC: Management of Recent CT Scan    History of Present Illness     Pt returns today after sending this message on 5/8/24:    I just got set up with a new cardiologist (Dr. Beronica Rene). She specializes in women's heart health and given my family history I wanted to get out ahead of anything cardiac. She had me get a calcium score, which was okay but not zero. CT scan mentioned a small lung opacity and some thickening of the adrenal gland. Can you take a look at that report and let me know if I should be concerned about either of those?     Pt was asked to come in to discuss further.        The following portions of the patient's history were reviewed and updated as appropriate: allergies, current medications, past family history, past medical history, past social history, past surgical history, and problem list.    Review of Systems   Constitutional:  Negative for activity change, chills and fever.   Respiratory:  Negative for cough.    Cardiovascular:  Negative for chest pain.   Psychiatric/Behavioral:  Negative for dysphoric mood.        /74   Pulse 77   Temp 98.1 °F (36.7 °C) (Oral)   Resp 16   Ht 172.7 cm (67.99\")   Wt 72.1 kg (159 lb)   SpO2 98%   BMI 24.18 kg/m²     Objective   Physical Exam  Vitals and nursing note reviewed.   Constitutional:       General: She is not in acute distress.     Appearance: She is well-developed.   Pulmonary:      Effort: Pulmonary effort is normal.   Neurological:      Mental Status: She is alert and oriented to person, place, and time.   Psychiatric:         Behavior: Behavior normal.         Thought Content: Thought content normal.     CT report reviewed by me at today's visit.    Assessment & Plan   Diagnoses and all orders for this visit:    1. Abnormal chest CT (Primary)  -     Cancel: CT abdomen wo contrast; Future  -     CT abdomen wo contrast; Future    2. Pulmonary nodule  -     Cancel: CT Chest Without " Contrast; Future  -     CT Chest Without Contrast; Future

## 2024-06-23 DIAGNOSIS — Z00.00 ANNUAL PHYSICAL EXAM: Primary | ICD-10-CM

## 2024-06-24 LAB
ALBUMIN SERPL-MCNC: 4.5 G/DL (ref 3.5–5.2)
ALBUMIN/GLOB SERPL: 2.4 G/DL
ALP SERPL-CCNC: 54 U/L (ref 39–117)
ALT SERPL-CCNC: 14 U/L (ref 1–33)
AST SERPL-CCNC: 15 U/L (ref 1–32)
BASOPHILS # BLD AUTO: 0.03 10*3/MM3 (ref 0–0.2)
BASOPHILS NFR BLD AUTO: 0.7 % (ref 0–1.5)
BILIRUB SERPL-MCNC: 0.3 MG/DL (ref 0–1.2)
BUN SERPL-MCNC: 13 MG/DL (ref 6–20)
BUN/CREAT SERPL: 15.1 (ref 7–25)
CALCIUM SERPL-MCNC: 9.3 MG/DL (ref 8.6–10.5)
CHLORIDE SERPL-SCNC: 104 MMOL/L (ref 98–107)
CO2 SERPL-SCNC: 27.3 MMOL/L (ref 22–29)
CREAT SERPL-MCNC: 0.86 MG/DL (ref 0.57–1)
EGFRCR SERPLBLD CKD-EPI 2021: 82.4 ML/MIN/1.73
EOSINOPHIL # BLD AUTO: 0.09 10*3/MM3 (ref 0–0.4)
EOSINOPHIL NFR BLD AUTO: 2 % (ref 0.3–6.2)
ERYTHROCYTE [DISTWIDTH] IN BLOOD BY AUTOMATED COUNT: 12 % (ref 12.3–15.4)
GLOBULIN SER CALC-MCNC: 1.9 GM/DL
GLUCOSE SERPL-MCNC: 95 MG/DL (ref 65–99)
HCT VFR BLD AUTO: 41.1 % (ref 34–46.6)
HGB BLD-MCNC: 13.7 G/DL (ref 12–15.9)
IMM GRANULOCYTES # BLD AUTO: 0.01 10*3/MM3 (ref 0–0.05)
IMM GRANULOCYTES NFR BLD AUTO: 0.2 % (ref 0–0.5)
LYMPHOCYTES # BLD AUTO: 1.86 10*3/MM3 (ref 0.7–3.1)
LYMPHOCYTES NFR BLD AUTO: 42.1 % (ref 19.6–45.3)
MCH RBC QN AUTO: 30.1 PG (ref 26.6–33)
MCHC RBC AUTO-ENTMCNC: 33.3 G/DL (ref 31.5–35.7)
MCV RBC AUTO: 90.3 FL (ref 79–97)
MONOCYTES # BLD AUTO: 0.42 10*3/MM3 (ref 0.1–0.9)
MONOCYTES NFR BLD AUTO: 9.5 % (ref 5–12)
NEUTROPHILS # BLD AUTO: 2.01 10*3/MM3 (ref 1.7–7)
NEUTROPHILS NFR BLD AUTO: 45.5 % (ref 42.7–76)
NRBC BLD AUTO-RTO: 0 /100 WBC (ref 0–0.2)
PLATELET # BLD AUTO: 204 10*3/MM3 (ref 140–450)
POTASSIUM SERPL-SCNC: 4.1 MMOL/L (ref 3.5–5.2)
PROT SERPL-MCNC: 6.4 G/DL (ref 6–8.5)
RBC # BLD AUTO: 4.55 10*6/MM3 (ref 3.77–5.28)
SODIUM SERPL-SCNC: 142 MMOL/L (ref 136–145)
TSH SERPL DL<=0.005 MIU/L-ACNC: 2.55 UIU/ML (ref 0.27–4.2)
WBC # BLD AUTO: 4.42 10*3/MM3 (ref 3.4–10.8)

## 2024-07-03 ENCOUNTER — OFFICE VISIT (OUTPATIENT)
Dept: FAMILY MEDICINE CLINIC | Facility: CLINIC | Age: 49
End: 2024-07-03
Payer: COMMERCIAL

## 2024-07-03 VITALS
HEART RATE: 88 BPM | DIASTOLIC BLOOD PRESSURE: 60 MMHG | RESPIRATION RATE: 16 BRPM | HEIGHT: 68 IN | TEMPERATURE: 97.9 F | BODY MASS INDEX: 24.25 KG/M2 | SYSTOLIC BLOOD PRESSURE: 100 MMHG | OXYGEN SATURATION: 97 % | WEIGHT: 160 LBS

## 2024-07-03 DIAGNOSIS — M25.511 CHRONIC RIGHT SHOULDER PAIN: ICD-10-CM

## 2024-07-03 DIAGNOSIS — Z00.00 ANNUAL PHYSICAL EXAM: Primary | ICD-10-CM

## 2024-07-03 DIAGNOSIS — G89.29 CHRONIC RIGHT SHOULDER PAIN: ICD-10-CM

## 2024-07-03 PROCEDURE — 99396 PREV VISIT EST AGE 40-64: CPT | Performed by: FAMILY MEDICINE

## 2024-07-03 NOTE — PROGRESS NOTES
"Subjective   Opal Yates is a 48 y.o. female.     CC: Annual Exam    History of Present Illness     Opal Yates 48 y.o. female who presents for an Annual Wellness Visit.  she has a history of   Patient Active Problem List   Diagnosis    Calculus of gallbladder without cholecystitis without obstruction    Family history of breast cancer in mother    Family history of colon cancer    Irritable bowel syndrome with diarrhea    Gastroesophageal reflux disease    Encounter for screening for malignant neoplasm of colon    [place] neck deformity of cervical spine    Cervical radiculopathy    Lumbar radiculopathy   .  she has been feeling well.   I  reviewed health maintenance with her as part of my preventative care plan.    The following portions of the patient's history were reviewed and updated as appropriate: allergies, current medications, past family history, past medical history, past social history, past surgical history, and problem list.    Review of Systems   Constitutional:  Negative for appetite change, fever and unexpected weight change.   HENT:  Negative for ear pain, facial swelling and sore throat.    Eyes:  Negative for pain and visual disturbance.   Respiratory:  Negative for chest tightness, shortness of breath and wheezing.    Cardiovascular:  Negative for chest pain and palpitations.   Gastrointestinal:  Negative for abdominal pain and blood in stool.   Endocrine: Negative.    Genitourinary:  Negative for difficulty urinating and hematuria.   Musculoskeletal:  Negative for joint swelling.   Neurological:  Negative for tremors, seizures and syncope.   Hematological:  Negative for adenopathy.   Psychiatric/Behavioral: Negative.         /60   Pulse 88   Temp 97.9 °F (36.6 °C)   Resp 16   Ht 172.7 cm (67.99\")   Wt 72.6 kg (160 lb)   SpO2 97%   BMI 24.34 kg/m²     Objective   Physical Exam  Vitals and nursing note reviewed. Exam conducted with a chaperone present.   Constitutional:       " Appearance: She is well-developed.   HENT:      Head: Normocephalic and atraumatic.      Right Ear: External ear normal.      Left Ear: External ear normal.      Mouth/Throat:      Pharynx: No oropharyngeal exudate.   Eyes:      General: No scleral icterus.     Conjunctiva/sclera: Conjunctivae normal.      Pupils: Pupils are equal, round, and reactive to light.   Neck:      Thyroid: No thyromegaly.   Cardiovascular:      Rate and Rhythm: Normal rate and regular rhythm.      Heart sounds: No murmur heard.     No gallop.   Pulmonary:      Effort: Pulmonary effort is normal.      Breath sounds: Normal breath sounds. No wheezing or rales.   Abdominal:      General: Bowel sounds are normal. There is no distension.      Palpations: Abdomen is soft. There is no mass.      Tenderness: There is no abdominal tenderness.      Hernia: No hernia is present.   Musculoskeletal:         General: No tenderness or deformity. Normal range of motion.      Cervical back: Normal range of motion and neck supple.   Lymphadenopathy:      Cervical: No cervical adenopathy.   Skin:     General: Skin is warm and dry.      Findings: No rash.   Neurological:      Mental Status: She is alert and oriented to person, place, and time.      Deep Tendon Reflexes: Reflexes are normal and symmetric.   Psychiatric:         Behavior: Behavior normal.     Labs reviewed with pt today during visit. All questions answered.  Kika present for exam.    Assessment & Plan   Diagnoses and all orders for this visit:    1. Annual physical exam (Primary)    2. Chronic right shoulder pain  -     Ambulatory Referral to Physical Therapy for Evaluation & Treatment    Healthy diet/exercise/weight management discussed with pt.

## 2025-01-27 ENCOUNTER — PROCEDURE VISIT (OUTPATIENT)
Dept: OBSTETRICS AND GYNECOLOGY | Facility: CLINIC | Age: 50
End: 2025-01-27
Payer: COMMERCIAL

## 2025-01-27 ENCOUNTER — OFFICE VISIT (OUTPATIENT)
Dept: OBSTETRICS AND GYNECOLOGY | Facility: CLINIC | Age: 50
End: 2025-01-27
Payer: COMMERCIAL

## 2025-01-27 VITALS
HEIGHT: 68 IN | HEART RATE: 56 BPM | SYSTOLIC BLOOD PRESSURE: 89 MMHG | BODY MASS INDEX: 23.89 KG/M2 | DIASTOLIC BLOOD PRESSURE: 56 MMHG | WEIGHT: 157.6 LBS

## 2025-01-27 DIAGNOSIS — Z12.31 BREAST CANCER SCREENING BY MAMMOGRAM: ICD-10-CM

## 2025-01-27 DIAGNOSIS — Z01.419 ENCOUNTER FOR WELL WOMAN EXAM WITH ROUTINE GYNECOLOGICAL EXAM: Primary | ICD-10-CM

## 2025-01-27 DIAGNOSIS — Z12.31 VISIT FOR SCREENING MAMMOGRAM: Primary | ICD-10-CM

## 2025-01-27 DIAGNOSIS — Z80.3 FAMILY HISTORY OF BREAST CANCER: ICD-10-CM

## 2025-01-27 DIAGNOSIS — Z98.51 HISTORY OF TUBAL LIGATION: ICD-10-CM

## 2025-01-27 RX ORDER — VIT C/B6/B5/MAGNESIUM/HERB 173 50-5-6-5MG
CAPSULE ORAL
COMMUNITY

## 2025-01-27 NOTE — PROGRESS NOTES
GYN Annual Exam     CC- Here for annual exam.     Opal Yates is a 49 y.o. female who presents for annual well woman exam. Periods are regular every 33-40 days, lasting 5 days. Dysmenorrhea:mild, occurring premenstrually a week before and first 1-2 days of flow. Cyclic symptoms include none. No intermenstrual bleeding, spotting, or discharge.    OB History          3    Para   3    Term   3            AB        Living             SAB        IAB        Ectopic        Molar        Multiple   1    Live Births                    Current contraception: tubal ligation  History of abnormal Pap smear: no  Family history of uterine, colon or ovarian cancer: yes- Colon cancer- MGF, sister diagnosed at age 47 with colon cancer, MGM had female cancer but uncertain of the kind.  History of abnormal mammogram: yes-  2019- BIRADS 0--> diagnostic imaging revealed complicated cyst of the left breast in 2020 and she underwent cyst aspiration that returned benign.   Family history of breast cancer:  yes- Mother diagnosed in late 50s ; maternal aunt. Two paternal aunts also had breast cancer.   Last Pap : 24- NILM, negative HPV   Last Completed Pap Smear            PAP SMEAR (Every 3 Years) Next due on 2024  IGP, Apt HPV,rfx 16 / 18,45    2022  IGP, Apt HPV,rfx 16 / 18,45    2019  Pap IG, Rfx HPV ASCU    2018  Pap IG, Rfx HPV ASCU    2017  Pap IG, Rfx HPV ASCU - ThinPrep Vial, Cervix    Only the first 5 history entries have been loaded, but more history exists.                   Last mammogram: 24- BIRADS-1  Last Completed Mammogram            Ordered - MAMMOGRAM (Every 2 Years) Ordered on 2024  Mammo Screening Digital Tomosynthesis Bilateral With CAD    2023  Mammo Screening Digital Tomosynthesis Bilateral With CAD    2022  Mammo Screening Digital Tomosynthesis Bilateral With CAD    2021  Mammo Screening Digital  Tomosynthesis Bilateral With CAD    2020  Mammo Diagnostic Left With CAD    Only the first 5 history entries have been loaded, but more history exists.                   Last colonoscopy:  22- repeat in 10 years   Last Completed Colonoscopy            COLORECTAL CANCER SCREENING (COLONOSCOPY - Every 10 Years) Tentatively due on 2022  COLONOSCOPY    2022  Surgical Procedure: COLONOSCOPY    2017  Surgical Procedure: COLONOSCOPY                    Past Medical History:   Diagnosis Date    Allergic 1993    Seasonal & Penicillan    Allergies     Arthritis 2019    Calculus of gallbladder without cholecystitis without obstruction 2017    Cervical radiculopathy 2023    Depression 2007    Postpartum    Encounter for screening for malignant neoplasm of colon 2021    Gastroesophageal reflux disease 2021    Hernia     CT scan noted a hiatal hernia.    Hyperlipidemia 2023    Elevated Triglicerides    Irritable bowel disease     Irritable bowel syndrome with constipation 2021    Low back pain 2019    Arthritis    Numbness and tingling     Pneumonia 2008    Syncope        Past Surgical History:   Procedure Laterality Date     SECTION      CHOLECYSTECTOMY      COLONOSCOPY N/A 2017    Procedure: COLONOSCOPY;  Surgeon: Dimitry Arellano MD;  Location: [facility] ENDOSCOPY;  Service:     COLONOSCOPY N/A 2022    Procedure: COLONOSCOPY into cecum and terminal ileum;  Surgeon: Latrell Angulo MD;  Location: [facility] ENDOSCOPY;  Service: Gastroenterology;  Laterality: N/A;  Pre op: Family history of colon cancer  Post op: Hemorrhoids    COLONOSCOPY  2022    ENDOSCOPY N/A 2022    Procedure: ESOPHAGOGASTRODUODENOSCOPY with biopsies;  Surgeon: Latrell Angulo MD;  Location: [facility] ENDOSCOPY;  Service: Gastroenterology;  Laterality: N/A;  Pre op: GERD  Post op: Gastritis    MAMMO BILATERAL  2016    dr pfeiffer    PAP  SMEAR  11/2016    dr pfeiffer    TUBAL ABDOMINAL LIGATION  2007         Current Outpatient Medications:     acetaminophen (TYLENOL) 500 MG tablet, Take  by mouth Every 6 (Six) Hours As Needed. (Patient taking differently: Take  by mouth As Needed.), Disp: , Rfl:     lansoprazole (PREVACID) 30 MG capsule, Take 1 capsule by mouth Daily. (Patient taking differently: Take 1 capsule by mouth As Needed.), Disp: 90 capsule, Rfl: 3    multivitamin tablet tablet, Take 1 tablet by mouth Daily., Disp: , Rfl:     Turmeric (QC Tumeric Complex) 500 MG capsule, Take  by mouth., Disp: , Rfl:     vitamin D3 125 MCG (5000 UT) capsule capsule, , Disp: , Rfl:     Biotin 5 MG capsule, , Disp: , Rfl:     Allergies   Allergen Reactions    Penicillins Rash       Social History     Tobacco Use    Smoking status: Never    Smokeless tobacco: Never    Tobacco comments:     occas  soda   Vaping Use    Vaping status: Never Used   Substance Use Topics    Alcohol use: Yes     Comment: Very rarely. Maybe once every few months.    Drug use: No       Family History   Problem Relation Age of Onset    Stroke Mother     Breast cancer Mother     Heart disease Mother         Congestive Heart Failure    Arthritis Mother     Obesity Mother     Asthma Mother     Cancer Mother         Breast    Arthritis Father     Heart disease Father         Bypass    Stroke Father     Hypertension Father     Hyperlipidemia Father     Thyroid disease Father         hypothyroid    Asthma Father     Cancer Father         Skin    Depression Father     Hearing loss Father     Colon cancer Sister         She was 47 when diagnosed.    Colon polyps Sister     Irritable bowel syndrome Sister     Cancer Sister         Colon    Miscarriages / Stillbirths Sister         Miscarriage    Breast cancer Maternal Aunt     Heart disease Maternal Uncle         Congestive Heart Failure    Heart disease Paternal Uncle         Stroke    Cervical cancer Maternal Grandmother     Cancer Maternal  "Grandmother         Pancreatic    Colon cancer Maternal Grandfather         Had a colostomy    Cancer Maternal Grandfather         Lung/Brain    Stroke Paternal Grandfather     Heart disease Paternal Grandfather         Stroke    Anxiety disorder Daughter     Asthma Daughter     Depression Daughter     Thyroid disease Daughter         Hashimoto's    Developmental Disability Son         Autism    Anxiety disorder Son     Cancer Maternal Aunt         Breast    Miscarriages / Stillbirths Sister         Miscarriage    Cancer Paternal Grandmother         Uterine    Cancer Paternal Aunt         Breast    Malig Hyperthermia Neg Hx        Review of Systems   All other systems reviewed and are negative.      BP (!) 89/56   Pulse 56   Ht 172.7 cm (67.99\")   Wt 71.5 kg (157 lb 9.6 oz)   LMP 01/11/2025 (Exact Date)   BMI 23.97 kg/m²     Physical Exam  Vitals reviewed. Exam conducted with a chaperone present.   Constitutional:       General: She is not in acute distress.  HENT:      Head: Normocephalic and atraumatic.      Right Ear: External ear normal.      Left Ear: External ear normal.   Eyes:      Extraocular Movements: Extraocular movements intact.      Pupils: Pupils are equal, round, and reactive to light.   Cardiovascular:      Rate and Rhythm: Normal rate.   Pulmonary:      Effort: Pulmonary effort is normal. No respiratory distress.   Chest:   Breasts:     Right: No swelling, bleeding, inverted nipple, mass, nipple discharge, skin change or tenderness.      Left: No swelling, bleeding, inverted nipple, mass, nipple discharge, skin change or tenderness.   Abdominal:      General: There is no distension.      Palpations: Abdomen is soft.      Tenderness: There is no abdominal tenderness. There is no guarding or rebound.   Genitourinary:     General: Normal vulva.      Exam position: Lithotomy position.      Labia:         Right: No rash, tenderness, lesion or injury.         Left: No rash, tenderness, lesion or " injury.       Urethra: No prolapse or urethral swelling.      Vagina: No vaginal discharge, erythema, tenderness, bleeding or lesions.      Cervix: Normal.      Uterus: Not enlarged, not fixed and not tender.       Adnexa:         Right: No mass, tenderness or fullness.          Left: No mass, tenderness or fullness.     Musculoskeletal:         General: No deformity. Normal range of motion.      Cervical back: Normal range of motion and neck supple.   Lymphadenopathy:      Upper Body:      Right upper body: No supraclavicular or axillary adenopathy.      Left upper body: No supraclavicular or axillary adenopathy.      Lower Body: No right inguinal adenopathy. No left inguinal adenopathy.   Skin:     General: Skin is warm and dry.   Neurological:      General: No focal deficit present.      Mental Status: She is alert and oriented to person, place, and time.   Psychiatric:         Mood and Affect: Mood normal.         Behavior: Behavior normal.       Assessment     1) GYN annual well woman exam.   2) History of tubal ligation   3) Breast cancer screening   4) Family history of breast cancer      Plan     1) Breast Health - Clinical breast exam & mammogram yearly, Self breast awareness monthly. Mammogram performed today for breast cancer screening.   2) Pap - Up to date.   3) Smoking status- non-smoker.   4) Colon health - screening colonoscopy recommended if not up to date.   5) Contraception- history of tubal ligation.   6) Activity recommends - Adult 150-300 min/week of multi-component physical activities that include balance training, aerobic and physical strengthening.    7) Follow up prn and one year      Zehra Rice MD

## 2025-06-23 DIAGNOSIS — Z00.00 ANNUAL PHYSICAL EXAM: Primary | ICD-10-CM

## 2025-06-28 LAB
ALBUMIN SERPL-MCNC: 4.4 G/DL (ref 3.5–5.2)
ALBUMIN/GLOB SERPL: 1.8 G/DL
ALP SERPL-CCNC: 54 U/L (ref 39–117)
ALT SERPL-CCNC: 13 U/L (ref 1–33)
AST SERPL-CCNC: 21 U/L (ref 1–32)
BASOPHILS # BLD AUTO: 0.03 10*3/MM3 (ref 0–0.2)
BASOPHILS NFR BLD AUTO: 0.5 % (ref 0–1.5)
BILIRUB SERPL-MCNC: 0.7 MG/DL (ref 0–1.2)
BUN SERPL-MCNC: 9 MG/DL (ref 6–20)
BUN/CREAT SERPL: 11.8 (ref 7–25)
CALCIUM SERPL-MCNC: 9.2 MG/DL (ref 8.6–10.5)
CHLORIDE SERPL-SCNC: 104 MMOL/L (ref 98–107)
CHOLEST SERPL-MCNC: 224 MG/DL (ref 0–200)
CO2 SERPL-SCNC: 27 MMOL/L (ref 22–29)
CREAT SERPL-MCNC: 0.76 MG/DL (ref 0.57–1)
EGFRCR SERPLBLD CKD-EPI 2021: 95 ML/MIN/1.73
EOSINOPHIL # BLD AUTO: 0.09 10*3/MM3 (ref 0–0.4)
EOSINOPHIL NFR BLD AUTO: 1.6 % (ref 0.3–6.2)
ERYTHROCYTE [DISTWIDTH] IN BLOOD BY AUTOMATED COUNT: 12.2 % (ref 12.3–15.4)
GLOBULIN SER CALC-MCNC: 2.4 GM/DL
GLUCOSE SERPL-MCNC: 88 MG/DL (ref 65–99)
HCT VFR BLD AUTO: 40.7 % (ref 34–46.6)
HDLC SERPL-MCNC: 53 MG/DL (ref 40–60)
HGB BLD-MCNC: 13.6 G/DL (ref 12–15.9)
IMM GRANULOCYTES # BLD AUTO: 0.01 10*3/MM3 (ref 0–0.05)
IMM GRANULOCYTES NFR BLD AUTO: 0.2 % (ref 0–0.5)
LDLC SERPL CALC-MCNC: 146 MG/DL (ref 0–100)
LYMPHOCYTES # BLD AUTO: 1.72 10*3/MM3 (ref 0.7–3.1)
LYMPHOCYTES NFR BLD AUTO: 31.4 % (ref 19.6–45.3)
MCH RBC QN AUTO: 30.6 PG (ref 26.6–33)
MCHC RBC AUTO-ENTMCNC: 33.4 G/DL (ref 31.5–35.7)
MCV RBC AUTO: 91.5 FL (ref 79–97)
MONOCYTES # BLD AUTO: 0.49 10*3/MM3 (ref 0.1–0.9)
MONOCYTES NFR BLD AUTO: 8.9 % (ref 5–12)
NEUTROPHILS # BLD AUTO: 3.14 10*3/MM3 (ref 1.7–7)
NEUTROPHILS NFR BLD AUTO: 57.4 % (ref 42.7–76)
NRBC BLD AUTO-RTO: 0 /100 WBC (ref 0–0.2)
PLATELET # BLD AUTO: 192 10*3/MM3 (ref 140–450)
POTASSIUM SERPL-SCNC: 4.3 MMOL/L (ref 3.5–5.2)
PROT SERPL-MCNC: 6.8 G/DL (ref 6–8.5)
RBC # BLD AUTO: 4.45 10*6/MM3 (ref 3.77–5.28)
SODIUM SERPL-SCNC: 139 MMOL/L (ref 136–145)
TRIGL SERPL-MCNC: 142 MG/DL (ref 0–150)
TSH SERPL DL<=0.005 MIU/L-ACNC: 2.88 UIU/ML (ref 0.27–4.2)
VLDLC SERPL CALC-MCNC: 25 MG/DL (ref 5–40)
WBC # BLD AUTO: 5.48 10*3/MM3 (ref 3.4–10.8)

## 2025-07-05 NOTE — PROGRESS NOTES
"Subjective   Opal Yates is a 49 y.o. female.     CC: Annual Exam    History of Present Illness     Opal Yates 49 y.o. female who presents for an Annual Wellness Visit.  she has a history of   Patient Active Problem List   Diagnosis    Calculus of gallbladder without cholecystitis without obstruction    Family history of breast cancer in mother    Family history of colon cancer    Irritable bowel syndrome with diarrhea    Gastroesophageal reflux disease    Encounter for screening for malignant neoplasm of colon    Yelm neck deformity of cervical spine    Cervical radiculopathy    Lumbar radiculopathy   .  she has been feeling well.   I  reviewed health maintenance with her as part of my preventative care plan.    The following portions of the patient's history were reviewed and updated as appropriate: allergies, current medications, past family history, past medical history, past social history, past surgical history, and problem list.    Review of Systems   Constitutional:  Negative for appetite change, fever and unexpected weight change.   HENT:  Negative for ear pain, facial swelling and sore throat.    Eyes:  Negative for pain and visual disturbance.   Respiratory:  Negative for chest tightness, shortness of breath and wheezing.    Cardiovascular:  Negative for chest pain and palpitations.   Gastrointestinal:  Negative for abdominal pain and blood in stool.   Endocrine: Negative.    Genitourinary:  Negative for difficulty urinating and hematuria.   Musculoskeletal:  Negative for joint swelling.   Neurological:  Negative for tremors, seizures and syncope.   Hematological:  Negative for adenopathy.   Psychiatric/Behavioral: Negative.         /62   Pulse 80   Temp 97.7 °F (36.5 °C) (Temporal)   Resp 16   Ht 172.7 cm (67.99\")   Wt 72.4 kg (159 lb 9.6 oz)   SpO2 98%   BMI 24.27 kg/m²     Objective   Physical Exam  Vitals and nursing note reviewed. Exam conducted with a chaperone present. "   Constitutional:       Appearance: She is well-developed.   HENT:      Head: Normocephalic and atraumatic.      Right Ear: External ear normal.      Left Ear: External ear normal.      Mouth/Throat:      Pharynx: No oropharyngeal exudate.   Eyes:      General: No scleral icterus.     Conjunctiva/sclera: Conjunctivae normal.      Pupils: Pupils are equal, round, and reactive to light.   Neck:      Thyroid: No thyromegaly.   Cardiovascular:      Rate and Rhythm: Normal rate and regular rhythm.      Heart sounds: No murmur heard.     No gallop.   Pulmonary:      Effort: Pulmonary effort is normal.      Breath sounds: Normal breath sounds. No wheezing or rales.   Abdominal:      General: Bowel sounds are normal. There is no distension.      Palpations: Abdomen is soft. There is no mass.      Tenderness: There is no abdominal tenderness.      Hernia: No hernia is present.   Musculoskeletal:         General: No tenderness or deformity. Normal range of motion.      Cervical back: Normal range of motion and neck supple.   Lymphadenopathy:      Cervical: No cervical adenopathy.   Skin:     General: Skin is warm and dry.      Findings: No rash.   Neurological:      Mental Status: She is alert and oriented to person, place, and time.      Deep Tendon Reflexes: Reflexes are normal and symmetric.   Psychiatric:         Behavior: Behavior normal.     Labs reviewed with pt today during visit. All questions answered.  Maye present for exam.    Assessment & Plan   Diagnoses and all orders for this visit:    1. Annual physical exam (Primary)    Healthy diet/exercise/weight management discussed with pt.

## 2025-07-07 ENCOUNTER — OFFICE VISIT (OUTPATIENT)
Dept: FAMILY MEDICINE CLINIC | Facility: CLINIC | Age: 50
End: 2025-07-07
Payer: COMMERCIAL

## 2025-07-07 VITALS
OXYGEN SATURATION: 98 % | SYSTOLIC BLOOD PRESSURE: 110 MMHG | HEART RATE: 80 BPM | WEIGHT: 159.6 LBS | HEIGHT: 68 IN | RESPIRATION RATE: 16 BRPM | BODY MASS INDEX: 24.19 KG/M2 | DIASTOLIC BLOOD PRESSURE: 62 MMHG | TEMPERATURE: 97.7 F

## 2025-07-07 DIAGNOSIS — Z00.00 ANNUAL PHYSICAL EXAM: Primary | ICD-10-CM

## 2025-07-07 PROCEDURE — 99396 PREV VISIT EST AGE 40-64: CPT | Performed by: FAMILY MEDICINE

## 2025-07-07 RX ORDER — CHLORZOXAZONE 500 MG/1
500 TABLET ORAL 4 TIMES DAILY
COMMUNITY
End: 2025-07-07

## 2025-07-10 ENCOUNTER — PATIENT ROUNDING (BHMG ONLY) (OUTPATIENT)
Dept: FAMILY MEDICINE CLINIC | Facility: CLINIC | Age: 50
End: 2025-07-10
Payer: COMMERCIAL

## 2025-07-10 NOTE — PROGRESS NOTES
A My-Chart message has been sent to the patient for PATIENT ROUNDING with [facility] [place]

## (undated) DEVICE — KT ORCA ORCAPOD DISP STRL

## (undated) DEVICE — TUBING, SUCTION, 1/4" X 10', STRAIGHT: Brand: MEDLINE

## (undated) DEVICE — THE TORRENT IRRIGATION SCOPE CONNECTOR IS USED WITH THE TORRENT IRRIGATION TUBING TO PROVIDE IRRIGATION FLUIDS SUCH AS STERILE WATER DURING GASTROINTESTINAL ENDOSCOPIC PROCEDURES WHEN USED IN CONJUNCTION WITH AN IRRIGATION PUMP (OR ELECTROSURGICAL UNIT).: Brand: TORRENT

## (undated) DEVICE — ADAPT CLN BIOGUARD AIR/H2O DISP

## (undated) DEVICE — CANN O2 ETCO2 FITS ALL CONN CO2 SMPL A/ 7IN DISP LF

## (undated) DEVICE — TBG 02 CRUSH RESIST LF CLR 7FT

## (undated) DEVICE — SENSR O2 OXIMAX FNGR A/ 18IN NONSTR

## (undated) DEVICE — BITEBLOCK OMNI BLOC

## (undated) DEVICE — SINGLE-USE BIOPSY FORCEPS: Brand: RADIAL JAW 4

## (undated) DEVICE — LN SMPL CO2 SHTRM SD STREAM W/M LUER

## (undated) DEVICE — Device: Brand: DEFENDO AIR/WATER/SUCTION AND BIOPSY VALVE

## (undated) DEVICE — Device: Brand: DEFENDO BIOPSY VALVE IRRIGATOR

## (undated) DEVICE — CANN NASL CO2 TRULINK W/O2 A/